# Patient Record
Sex: FEMALE | Race: BLACK OR AFRICAN AMERICAN | Employment: FULL TIME | URBAN - METROPOLITAN AREA
[De-identification: names, ages, dates, MRNs, and addresses within clinical notes are randomized per-mention and may not be internally consistent; named-entity substitution may affect disease eponyms.]

---

## 2019-07-06 ENCOUNTER — HOSPITAL ENCOUNTER (EMERGENCY)
Age: 39
Discharge: HOME | End: 2019-07-06
Attending: EMERGENCY MEDICINE
Payer: COMMERCIAL

## 2019-07-06 VITALS
HEART RATE: 116 BPM | HEIGHT: 67 IN | SYSTOLIC BLOOD PRESSURE: 142 MMHG | DIASTOLIC BLOOD PRESSURE: 74 MMHG | RESPIRATION RATE: 16 BRPM | BODY MASS INDEX: 45.99 KG/M2 | OXYGEN SATURATION: 98 % | TEMPERATURE: 98.1 F | WEIGHT: 293 LBS

## 2019-07-06 DIAGNOSIS — N30.01 ACUTE CYSTITIS WITH HEMATURIA: Primary | ICD-10-CM

## 2019-07-06 LAB
APPEARANCE UR: ABNORMAL
BACTERIA URNS QL MICRO: ABNORMAL /HPF
BILIRUB UR QL CFM: NEGATIVE
COLOR UR: ABNORMAL
EPITH CASTS URNS QL MICRO: ABNORMAL /LPF
GLUCOSE UR STRIP.AUTO-MCNC: NEGATIVE MG/DL
HGB UR QL STRIP: ABNORMAL
KETONES UR QL STRIP.AUTO: ABNORMAL MG/DL
LEUKOCYTE ESTERASE UR QL STRIP.AUTO: ABNORMAL
NITRITE UR QL STRIP.AUTO: NEGATIVE
PH UR STRIP: 5.5 [PH] (ref 5–8)
PROT UR STRIP-MCNC: 30 MG/DL
RBC #/AREA URNS HPF: ABNORMAL /HPF (ref 0–5)
SP GR UR REFRACTOMETRY: 1.03 (ref 1–1.03)
UA: UC IF INDICATED,UAUC: ABNORMAL
UROBILINOGEN UR QL STRIP.AUTO: 1 EU/DL (ref 0.2–1)
WBC URNS QL MICRO: ABNORMAL /HPF (ref 0–4)

## 2019-07-06 PROCEDURE — 99283 EMERGENCY DEPT VISIT LOW MDM: CPT

## 2019-07-06 PROCEDURE — 87086 URINE CULTURE/COLONY COUNT: CPT

## 2019-07-06 PROCEDURE — 87186 SC STD MICRODIL/AGAR DIL: CPT

## 2019-07-06 PROCEDURE — 81001 URINALYSIS AUTO W/SCOPE: CPT

## 2019-07-06 PROCEDURE — 74011250637 HC RX REV CODE- 250/637: Performed by: STUDENT IN AN ORGANIZED HEALTH CARE EDUCATION/TRAINING PROGRAM

## 2019-07-06 PROCEDURE — 87077 CULTURE AEROBIC IDENTIFY: CPT

## 2019-07-06 RX ORDER — NITROFURANTOIN 25; 75 MG/1; MG/1
100 CAPSULE ORAL
Status: COMPLETED | OUTPATIENT
Start: 2019-07-06 | End: 2019-07-06

## 2019-07-06 RX ORDER — NITROFURANTOIN 25; 75 MG/1; MG/1
100 CAPSULE ORAL 2 TIMES DAILY
Qty: 10 CAP | Refills: 0 | Status: SHIPPED | OUTPATIENT
Start: 2019-07-06 | End: 2019-07-11

## 2019-07-06 RX ADMIN — NITROFURANTOIN MONOHYDRATE/MACROCRYSTALLINE 100 MG: 25; 75 CAPSULE ORAL at 21:48

## 2019-07-07 NOTE — DISCHARGE INSTRUCTIONS

## 2019-07-07 NOTE — ED PROVIDER NOTES
EMERGENCY DEPARTMENT HISTORY AND PHYSICAL EXAM      Date: 7/6/2019  Patient Name: Dee Peabody    History of Presenting Illness     Chief Complaint   Patient presents with    Urinary Frequency     frequency, pain, urgency x2 weeks       History Provided By: Patient    HPI: Dee Peabody, 41 y.o. female with no significant PMHx, presents ambulatory to the ED with cc of urinary frequency x 2 weeks. Reports associated urgency, and intermittent trace hematuria. Denies dysuria, abdominal pain, fever, vaginal bleeding or discharge. Feels like previous UTI's. There are no other complaints, changes, or physical findings at this time. PCP: Jean Claude Dan MD    No current facility-administered medications on file prior to encounter. No current outpatient medications on file prior to encounter. Past History     Past Medical History:  Past Medical History:   Diagnosis Date    Cholestasis of pregnancy 4/3/2013    Diabetes mellitus     HX OTHER MEDICAL     cholestasis or pregnancy       Past Surgical History:  No past surgical history on file. Family History:  No family history on file. Social History:  Social History     Tobacco Use    Smoking status: Never Smoker   Substance Use Topics    Alcohol use: No    Drug use: No       Allergies: Allergies   Allergen Reactions    Penicillins Rash    Benadryl [Diphenhydramine Hcl] Unknown (comments)     Stomach aches per patient         Review of Systems   Review of Systems   Constitutional: Negative for chills and fever. HENT: Negative for congestion and rhinorrhea. Respiratory: Negative for cough and shortness of breath. Cardiovascular: Negative for chest pain and leg swelling. Gastrointestinal: Negative for abdominal pain, constipation, diarrhea, nausea and vomiting. Genitourinary: Positive for frequency, hematuria and urgency. Negative for difficulty urinating and dysuria.    Musculoskeletal: Negative for back pain and neck pain.   Skin: Negative for color change and rash. Neurological: Negative for dizziness, weakness, light-headedness, numbness and headaches. Psychiatric/Behavioral: Negative for agitation and confusion. Physical Exam   Physical Exam   Constitutional: She is oriented to person, place, and time. She appears well-developed and well-nourished. No distress. HENT:   Head: Normocephalic and atraumatic. Eyes: Pupils are equal, round, and reactive to light. Conjunctivae are normal.   Neck: Neck supple. No tracheal deviation present. Cardiovascular: Normal rate, regular rhythm and normal heart sounds. Pulmonary/Chest: Effort normal and breath sounds normal. No respiratory distress. Abdominal: Soft. Bowel sounds are normal. She exhibits no distension. There is no tenderness. Musculoskeletal: She exhibits no edema or deformity. Neurological: She is alert and oriented to person, place, and time. Skin: Skin is warm and dry. Psychiatric: She has a normal mood and affect. Nursing note and vitals reviewed.       Diagnostic Study Results     Labs -     Recent Results (from the past 12 hour(s))   URINALYSIS W/ REFLEX CULTURE    Collection Time: 07/06/19  9:00 PM   Result Value Ref Range    Color YELLOW/STRAW      Appearance CLOUDY (A) CLEAR      Specific gravity 1.027 1.003 - 1.030      pH (UA) 5.5 5.0 - 8.0      Protein 30 (A) NEG mg/dL    Glucose NEGATIVE  NEG mg/dL    Ketone TRACE (A) NEG mg/dL    Blood TRACE (A) NEG      Urobilinogen 1.0 0.2 - 1.0 EU/dL    Nitrites NEGATIVE  NEG      Leukocyte Esterase LARGE (A) NEG      WBC 20-50 0 - 4 /hpf    RBC 5-10 0 - 5 /hpf    Epithelial cells MODERATE (A) FEW /lpf    Bacteria 2+ (A) NEG /hpf    UA:UC IF INDICATED URINE CULTURE ORDERED (A) CNI     BILIRUBIN, CONFIRM    Collection Time: 07/06/19  9:00 PM   Result Value Ref Range    Bilirubin UA, confirm NEGATIVE  NEG         Radiologic Studies -   No orders to display     CT Results  (Last 48 hours)    None CXR Results  (Last 48 hours)    None            Medical Decision Making   I am the first provider for this patient. I reviewed the vital signs, available nursing notes, past medical history, past surgical history, family history and social history. Vital Signs-Reviewed the patient's vital signs. Patient Vitals for the past 12 hrs:   Temp Pulse Resp BP SpO2   07/06/19 2044 98.1 °F (36.7 °C) (!) 116 16 142/74 98 %       Pulse Oximetry Analysis - 100% on RA    Cardiac Monitor:   Rate: 100 bpm  Rhythm: Normal Sinus Rhythm     Records Reviewed: Nursing Notes    Provider Notes (Medical Decision Making):   DDx: UTI, urinary frequency    Will obtain UA, dc with abx if UTI found. ED Course:   Initial assessment performed. The patients presenting problems have been discussed, and they are in agreement with the care plan formulated and outlined with them. I have encouraged them to ask questions as they arise throughout their visit. 10:14 PM  UTI on UA. Will dc with Macrobid. Disposition:  Discharge home    PLAN:  1. Current Discharge Medication List      START taking these medications    Details   nitrofurantoin, macrocrystal-monohydrate, (MACROBID) 100 mg capsule Take 1 Cap by mouth two (2) times a day for 5 days. Indications: Bacterial Urinary Tract Infection  Qty: 10 Cap, Refills: 0           2. Follow-up Information     Follow up With Specialties Details Why Contact Info    Primary Care Physician  In 3 days      [facility] EMERGENCY DEPT Emergency Medicine  If symptoms worsen [address] [place] [address]  [phone]        Return to ED if worse     Diagnosis     Clinical Impression:   1.  Acute cystitis with hematuria        Attestations:    Signed by Dr. Cornelia Hendricks

## 2019-07-09 LAB
BACTERIA SPEC CULT: ABNORMAL
CC UR VC: ABNORMAL
SERVICE CMNT-IMP: ABNORMAL

## 2021-07-17 ENCOUNTER — HOSPITAL ENCOUNTER (INPATIENT)
Age: 41
LOS: 9 days | Discharge: HOME | DRG: 871 | End: 2021-07-26
Attending: EMERGENCY MEDICINE | Admitting: INTERNAL MEDICINE
Payer: COMMERCIAL

## 2021-07-17 ENCOUNTER — APPOINTMENT (OUTPATIENT)
Dept: GENERAL RADIOLOGY | Age: 41
DRG: 871 | End: 2021-07-17
Attending: EMERGENCY MEDICINE
Payer: COMMERCIAL

## 2021-07-17 DIAGNOSIS — U07.1 PNEUMONIA DUE TO COVID-19 VIRUS: Primary | ICD-10-CM

## 2021-07-17 DIAGNOSIS — J12.82 PNEUMONIA DUE TO COVID-19 VIRUS: Primary | ICD-10-CM

## 2021-07-17 LAB
ALBUMIN SERPL-MCNC: 3.6 G/DL (ref 3.5–5)
ALBUMIN/GLOB SERPL: 0.8 {RATIO} (ref 1.1–2.2)
ALP SERPL-CCNC: 174 U/L (ref 45–117)
ALT SERPL-CCNC: 96 U/L (ref 12–78)
ANION GAP SERPL CALC-SCNC: 5 MMOL/L (ref 5–15)
AST SERPL-CCNC: 90 U/L (ref 15–37)
BASOPHILS # BLD: 0 K/UL (ref 0–0.1)
BASOPHILS NFR BLD: 0 % (ref 0–1)
BILIRUB SERPL-MCNC: 0.4 MG/DL (ref 0.2–1)
BUN SERPL-MCNC: 7 MG/DL (ref 6–20)
BUN/CREAT SERPL: 7 (ref 12–20)
CALCIUM SERPL-MCNC: 8.5 MG/DL (ref 8.5–10.1)
CHLORIDE SERPL-SCNC: 102 MMOL/L (ref 97–108)
CO2 SERPL-SCNC: 31 MMOL/L (ref 21–32)
CREAT SERPL-MCNC: 1.04 MG/DL (ref 0.55–1.02)
D DIMER PPP FEU-MCNC: 0.57 MG/L FEU (ref 0–0.65)
DIFFERENTIAL METHOD BLD: ABNORMAL
EOSINOPHIL # BLD: 0 K/UL (ref 0–0.4)
EOSINOPHIL NFR BLD: 0 % (ref 0–7)
ERYTHROCYTE [DISTWIDTH] IN BLOOD BY AUTOMATED COUNT: 15.4 % (ref 11.5–14.5)
GLOBULIN SER CALC-MCNC: 4.8 G/DL (ref 2–4)
GLUCOSE SERPL-MCNC: 115 MG/DL (ref 65–100)
HCT VFR BLD AUTO: 42.7 % (ref 35–47)
HGB BLD-MCNC: 13.5 G/DL (ref 11.5–16)
IMM GRANULOCYTES # BLD AUTO: 0 K/UL (ref 0–0.04)
IMM GRANULOCYTES NFR BLD AUTO: 0 % (ref 0–0.5)
LACTATE BLD-SCNC: 1.21 MMOL/L (ref 0.4–2)
LYMPHOCYTES # BLD: 0.8 K/UL (ref 0.8–3.5)
LYMPHOCYTES NFR BLD: 12 % (ref 12–49)
MCH RBC QN AUTO: 26.5 PG (ref 26–34)
MCHC RBC AUTO-ENTMCNC: 31.6 G/DL (ref 30–36.5)
MCV RBC AUTO: 83.7 FL (ref 80–99)
MONOCYTES # BLD: 0.1 K/UL (ref 0–1)
MONOCYTES NFR BLD: 2 % (ref 5–13)
NEUTS BAND NFR BLD MANUAL: 4 %
NEUTS SEG # BLD: 5.5 K/UL (ref 1.8–8)
NEUTS SEG NFR BLD: 82 % (ref 32–75)
NRBC # BLD: 0 K/UL (ref 0–0.01)
NRBC BLD-RTO: 0 PER 100 WBC
PLATELET # BLD AUTO: 141 K/UL (ref 150–400)
PMV BLD AUTO: 10.1 FL (ref 8.9–12.9)
POTASSIUM SERPL-SCNC: 3.5 MMOL/L (ref 3.5–5.1)
PROT SERPL-MCNC: 8.4 G/DL (ref 6.4–8.2)
RBC # BLD AUTO: 5.1 M/UL (ref 3.8–5.2)
RBC MORPH BLD: ABNORMAL
SODIUM SERPL-SCNC: 138 MMOL/L (ref 136–145)
TROPONIN I SERPL-MCNC: <0.05 NG/ML
WBC # BLD AUTO: 6.4 K/UL (ref 3.6–11)
WBC MORPH BLD: ABNORMAL

## 2021-07-17 PROCEDURE — 65660000000 HC RM CCU STEPDOWN

## 2021-07-17 PROCEDURE — 71045 X-RAY EXAM CHEST 1 VIEW: CPT

## 2021-07-17 PROCEDURE — 74011250637 HC RX REV CODE- 250/637: Performed by: EMERGENCY MEDICINE

## 2021-07-17 PROCEDURE — 99285 EMERGENCY DEPT VISIT HI MDM: CPT

## 2021-07-17 PROCEDURE — 85379 FIBRIN DEGRADATION QUANT: CPT

## 2021-07-17 PROCEDURE — 96361 HYDRATE IV INFUSION ADD-ON: CPT

## 2021-07-17 PROCEDURE — 74011250637 HC RX REV CODE- 250/637: Performed by: GENERAL ACUTE CARE HOSPITAL

## 2021-07-17 PROCEDURE — 96360 HYDRATION IV INFUSION INIT: CPT

## 2021-07-17 PROCEDURE — 74011000250 HC RX REV CODE- 250: Performed by: GENERAL ACUTE CARE HOSPITAL

## 2021-07-17 PROCEDURE — 74011250636 HC RX REV CODE- 250/636: Performed by: EMERGENCY MEDICINE

## 2021-07-17 PROCEDURE — 36415 COLL VENOUS BLD VENIPUNCTURE: CPT

## 2021-07-17 PROCEDURE — 87040 BLOOD CULTURE FOR BACTERIA: CPT

## 2021-07-17 PROCEDURE — 74011000258 HC RX REV CODE- 258: Performed by: GENERAL ACUTE CARE HOSPITAL

## 2021-07-17 PROCEDURE — 80053 COMPREHEN METABOLIC PANEL: CPT

## 2021-07-17 PROCEDURE — 83605 ASSAY OF LACTIC ACID: CPT

## 2021-07-17 PROCEDURE — 84484 ASSAY OF TROPONIN QUANT: CPT

## 2021-07-17 PROCEDURE — 85025 COMPLETE CBC W/AUTO DIFF WBC: CPT

## 2021-07-17 PROCEDURE — 74011250636 HC RX REV CODE- 250/636: Performed by: GENERAL ACUTE CARE HOSPITAL

## 2021-07-17 PROCEDURE — 65270000029 HC RM PRIVATE

## 2021-07-17 PROCEDURE — 94640 AIRWAY INHALATION TREATMENT: CPT

## 2021-07-17 PROCEDURE — 93005 ELECTROCARDIOGRAM TRACING: CPT

## 2021-07-17 RX ORDER — ONDANSETRON 4 MG/1
4 TABLET, ORALLY DISINTEGRATING ORAL
Status: DISCONTINUED | OUTPATIENT
Start: 2021-07-17 | End: 2021-07-26 | Stop reason: HOSPADM

## 2021-07-17 RX ORDER — SODIUM CHLORIDE 0.9 % (FLUSH) 0.9 %
5-40 SYRINGE (ML) INJECTION AS NEEDED
Status: DISCONTINUED | OUTPATIENT
Start: 2021-07-17 | End: 2021-07-26 | Stop reason: HOSPADM

## 2021-07-17 RX ORDER — POLYETHYLENE GLYCOL 3350 17 G/17G
17 POWDER, FOR SOLUTION ORAL DAILY PRN
Status: DISCONTINUED | OUTPATIENT
Start: 2021-07-17 | End: 2021-07-26 | Stop reason: HOSPADM

## 2021-07-17 RX ORDER — ACETAMINOPHEN 500 MG
1000 TABLET ORAL
Status: COMPLETED | OUTPATIENT
Start: 2021-07-17 | End: 2021-07-17

## 2021-07-17 RX ORDER — ONDANSETRON 2 MG/ML
4 INJECTION INTRAMUSCULAR; INTRAVENOUS
Status: DISCONTINUED | OUTPATIENT
Start: 2021-07-17 | End: 2021-07-26 | Stop reason: HOSPADM

## 2021-07-17 RX ORDER — ACETAMINOPHEN 325 MG/1
650 TABLET ORAL
Status: DISCONTINUED | OUTPATIENT
Start: 2021-07-17 | End: 2021-07-26 | Stop reason: HOSPADM

## 2021-07-17 RX ORDER — SODIUM CHLORIDE 0.9 % (FLUSH) 0.9 %
5-40 SYRINGE (ML) INJECTION EVERY 8 HOURS
Status: DISCONTINUED | OUTPATIENT
Start: 2021-07-17 | End: 2021-07-26 | Stop reason: HOSPADM

## 2021-07-17 RX ORDER — ACETAMINOPHEN 650 MG/1
650 SUPPOSITORY RECTAL
Status: DISCONTINUED | OUTPATIENT
Start: 2021-07-17 | End: 2021-07-26 | Stop reason: HOSPADM

## 2021-07-17 RX ORDER — DEXAMETHASONE SODIUM PHOSPHATE 4 MG/ML
6 INJECTION, SOLUTION INTRA-ARTICULAR; INTRALESIONAL; INTRAMUSCULAR; INTRAVENOUS; SOFT TISSUE DAILY
Status: COMPLETED | OUTPATIENT
Start: 2021-07-18 | End: 2021-07-26

## 2021-07-17 RX ORDER — LEVOFLOXACIN 5 MG/ML
750 INJECTION, SOLUTION INTRAVENOUS EVERY 24 HOURS
Status: DISCONTINUED | OUTPATIENT
Start: 2021-07-17 | End: 2021-07-20

## 2021-07-17 RX ORDER — ENOXAPARIN SODIUM 100 MG/ML
40 INJECTION SUBCUTANEOUS 2 TIMES DAILY
Status: DISCONTINUED | OUTPATIENT
Start: 2021-07-18 | End: 2021-07-26 | Stop reason: HOSPADM

## 2021-07-17 RX ADMIN — ACETAMINOPHEN 1000 MG: 500 TABLET ORAL at 16:09

## 2021-07-17 RX ADMIN — SODIUM CHLORIDE 1000 ML: 9 INJECTION, SOLUTION INTRAVENOUS at 16:08

## 2021-07-17 RX ADMIN — LEVOFLOXACIN 750 MG: 5 INJECTION, SOLUTION INTRAVENOUS at 19:50

## 2021-07-17 RX ADMIN — IPRATROPIUM BROMIDE AND ALBUTEROL 1 PUFF: 20; 100 SPRAY, METERED RESPIRATORY (INHALATION) at 23:01

## 2021-07-17 RX ADMIN — Medication 10 ML: at 22:34

## 2021-07-17 RX ADMIN — IPRATROPIUM BROMIDE AND ALBUTEROL 1 PUFF: 20; 100 SPRAY, METERED RESPIRATORY (INHALATION) at 20:46

## 2021-07-17 RX ADMIN — REMDESIVIR 200 MG: 100 INJECTION, POWDER, LYOPHILIZED, FOR SOLUTION INTRAVENOUS at 20:46

## 2021-07-17 NOTE — ED NOTES
Patient attempted to provide UA sample at this time without success. Gave pt PO water, will attempt to obtain UA sample again.

## 2021-07-17 NOTE — H&P
Hospitalist Admission Note    NAME: Elliot Littlejohn   :  1980   MRN:  [id number]     Date/Time:  2021 7:08 PM    Patient PCP: None  ______________________________________________________________________  Given the patient's current clinical presentation, I have a high level of concern for decompensation if discharged from the emergency department. Complex decision making was performed, which includes reviewing the patient's available past medical records, laboratory results, and x-ray films. My assessment of this patient's clinical condition and my plan of care is as follows. Assessment / Plan:  Acute hypoxic respiratory failure secondary to COVID-19 PNA  Admit to Telemetry  Droplet Plus precautions  Continue O2 supplementation - currently on 3L NC - wean as tolerated  Pharmacy to dose Remdesivir  Pulmonary Consult  IV steroids, bronchodilators  Empiric abx  Send inflammatory markers  Follow procalcitonin  Incentive spirometer    CXR: IMPRESSION  Hypoinspiratory lungs with evidence of COVID 19 pneumonia. Code Status: Full  Surrogate Decision Maker:   DVT Prophylaxis: Lovenox  GI Prophylaxis: not indicated  Baseline: Independent      Subjective:   CHIEF COMPLAINT: SOB, fever    HISTORY OF PRESENT ILLNESS:     Prieto Alicea is a 47 y.o.  female who presents with CC listed above. Pt states that her daughter was the first one in the family to be diagnosed with COVID-19. They have been quaranting together since appox . Her  is also ill with COVID-19 but she states that she has had the worst of symptoms. She was tested at an outside facility on Monday and was told that she was positive on Tuesday. Her and her family have not been vaccinated. She endorses cough with minimal sputum production. We were asked to admit for work up and evaluation of the above problems.      Past Medical History:   Diagnosis Date    Cholestasis of pregnancy 4/3/2013    Diabetes mellitus     HX OTHER MEDICAL     cholestasis or pregnancy        No past surgical history on file. Social History     Tobacco Use    Smoking status: Never Smoker   Substance Use Topics    Alcohol use: No        No family history on file. Allergies   Allergen Reactions    Penicillins Rash    Benadryl [Diphenhydramine Hcl] Unknown (comments)     Stomach aches per patient        Prior to Admission medications    Not on File       REVIEW OF SYSTEMS:     I am not able to complete the review of systems because:    The patient is intubated and sedated    The patient has altered mental status due to his acute medical problems    The patient has baseline aphasia from prior stroke(s)    The patient has baseline dementia and is not reliable historian    The patient is in acute medical distress and unable to provide information           Total of 12 systems reviewed as follows:       POSITIVE= underlined text  Negative = text not underlined  General:  fever, chills, sweats, generalized weakness, weight loss/gain,      loss of appetite   Eyes:    blurred vision, eye pain, loss of vision, double vision  ENT:    rhinorrhea, pharyngitis   Respiratory:   cough, sputum production, SOB, BROOKS, wheezing, pleuritic pain   Cardiology:   chest pain, palpitations, orthopnea, PND, edema, syncope   Gastrointestinal:  abdominal pain , N/V, diarrhea, dysphagia, constipation, bleeding   Genitourinary:  frequency, urgency, dysuria, hematuria, incontinence   Muskuloskeletal :  arthralgia, myalgia, back pain  Hematology:  easy bruising, nose or gum bleeding, lymphadenopathy   Dermatological: rash, ulceration, pruritis, color change / jaundice  Endocrine:   hot flashes or polydipsia   Neurological:  headache, dizziness, confusion, focal weakness, paresthesia,     Speech difficulties, memory loss, gait difficulty  Psychological: Feelings of anxiety, depression, agitation    Objective:   VITALS:    Visit Vitals  /77   Pulse (!) 112   Temp 100.3 °F (37.9 °C)   Resp 30   Ht 5' 7\" (1.702 m)   Wt 138 kg (304 lb 3.8 oz)   SpO2 95%   BMI 47.65 kg/m²       PHYSICAL EXAM:    General:    Alert, cooperative, ill appearing  HEENT: Atraumatic, anicteric sclerae, pink conjunctivae  Neck:  Supple, symmetrical,  thyroid: non tender  Lungs:   Coarse BS, on 3L NC. Chest wall:  No tenderness  No Accessory muscle use. Heart:   Tachycardic  No  murmur   No edema  Abdomen:   Soft, non-tender. Not distended. Bowel sounds normal  Extremities: No cyanosis. No clubbing,      Skin turgor normal, Capillary refill normal, Radial dial pulse 2+  Skin:     Not pale. Not Jaundiced  No rashes   Psych:  Good insight. Not depressed. Not anxious or agitated. Neurologic: EOMs intact. No facial asymmetry. No aphasia or slurred speech. Symmetrical strength, Sensation grossly intact. Alert and oriented X 4.     _______________________________________________________________________  Care Plan discussed with:    Comments   Patient x    Family      RN x    Care Manager                    Consultant:      _______________________________________________________________________  Expected  Disposition:   Home with Family    HH/PT/OT/RN x   SNF/LTC    JERI    ________________________________________________________________________  TOTAL TIME:  54 Minutes    Critical Care Provided     Minutes non procedure based      Comments     Reviewed previous records   >50% of visit spent in counseling and coordination of care  Discussion with patient and/or family and questions answered       ________________________________________________________________________  Signed: George Gooden MD    Procedures: see electronic medical records for all procedures/Xrays and details which were not copied into this note but were reviewed prior to creation of Plan.     LAB DATA REVIEWED:    Recent Results (from the past 24 hour(s))   EKG, 12 LEAD, INITIAL    Collection Time: 07/17/21  3:37 PM   Result Value Ref Range    Ventricular Rate 125 BPM    Atrial Rate 125 BPM    P-R Interval 144 ms    QRS Duration 84 ms    Q-T Interval 294 ms    QTC Calculation (Bezet) 424 ms    Calculated P Axis 49 degrees    Calculated R Axis -9 degrees    Calculated T Axis 22 degrees    Diagnosis       Sinus tachycardia  Possible Left atrial enlargement  Cannot rule out Anterior infarct , age undetermined  No previous ECGs available     POC LACTIC ACID    Collection Time: 07/17/21  3:55 PM   Result Value Ref Range    Lactic Acid (POC) 1.21 0.40 - 2.00 mmol/L   TROPONIN I    Collection Time: 07/17/21  4:03 PM   Result Value Ref Range    Troponin-I, Qt. <0.05 <0.05 ng/mL   CBC WITH AUTOMATED DIFF    Collection Time: 07/17/21  4:03 PM   Result Value Ref Range    WBC 6.4 3.6 - 11.0 K/uL    RBC 5.10 3.80 - 5.20 M/uL    HGB 13.5 11.5 - 16.0 g/dL    HCT 42.7 35.0 - 47.0 %    MCV 83.7 80.0 - 99.0 FL    MCH 26.5 26.0 - 34.0 PG    MCHC 31.6 30.0 - 36.5 g/dL    RDW 15.4 (H) 11.5 - 14.5 %    PLATELET 287 (L) 061 - 400 K/uL    MPV 10.1 8.9 - 12.9 FL    NRBC 0.0 0  WBC    ABSOLUTE NRBC 0.00 0.00 - 0.01 K/uL    NEUTROPHILS 82 (H) 32 - 75 %    BAND NEUTROPHILS 4 %    LYMPHOCYTES 12 12 - 49 %    MONOCYTES 2 (L) 5 - 13 %    EOSINOPHILS 0 0 - 7 %    BASOPHILS 0 0 - 1 %    IMMATURE GRANULOCYTES 0 0.0 - 0.5 %    ABS. NEUTROPHILS 5.5 1.8 - 8.0 K/UL    ABS. LYMPHOCYTES 0.8 0.8 - 3.5 K/UL    ABS. MONOCYTES 0.1 0.0 - 1.0 K/UL    ABS. EOSINOPHILS 0.0 0.0 - 0.4 K/UL    ABS. BASOPHILS 0.0 0.0 - 0.1 K/UL    ABS. IMM.  GRANS. 0.0 0.00 - 0.04 K/UL    DF MANUAL      RBC COMMENTS NORMOCYTIC, NORMOCHROMIC      WBC COMMENTS REACTIVE LYMPHS     METABOLIC PANEL, COMPREHENSIVE    Collection Time: 07/17/21  4:03 PM   Result Value Ref Range    Sodium 138 136 - 145 mmol/L    Potassium 3.5 3.5 - 5.1 mmol/L    Chloride 102 97 - 108 mmol/L    CO2 31 21 - 32 mmol/L    Anion gap 5 5 - 15 mmol/L    Glucose 115 (H) 65 - 100 mg/dL    BUN 7 6 - 20 MG/DL    Creatinine 1.04 (H) 0.55 - 1.02 MG/DL    BUN/Creatinine ratio 7 (L) 12 - 20      GFR est AA >60 >60 ml/min/1.73m2    GFR est non-AA 59 (L) >60 ml/min/1.73m2    Calcium 8.5 8.5 - 10.1 MG/DL    Bilirubin, total 0.4 0.2 - 1.0 MG/DL    ALT (SGPT) 96 (H) 12 - 78 U/L    AST (SGOT) 90 (H) 15 - 37 U/L    Alk.  phosphatase 174 (H) 45 - 117 U/L    Protein, total 8.4 (H) 6.4 - 8.2 g/dL    Albumin 3.6 3.5 - 5.0 g/dL    Globulin 4.8 (H) 2.0 - 4.0 g/dL    A-G Ratio 0.8 (L) 1.1 - 2.2

## 2021-07-18 LAB
ALBUMIN SERPL-MCNC: 3.1 G/DL (ref 3.5–5)
ALBUMIN/GLOB SERPL: 0.7 {RATIO} (ref 1.1–2.2)
ALP SERPL-CCNC: 176 U/L (ref 45–117)
ALT SERPL-CCNC: 96 U/L (ref 12–78)
ANION GAP SERPL CALC-SCNC: 6 MMOL/L (ref 5–15)
APPEARANCE UR: ABNORMAL
AST SERPL-CCNC: 99 U/L (ref 15–37)
ATRIAL RATE: 125 BPM
BACTERIA URNS QL MICRO: NEGATIVE /HPF
BASOPHILS # BLD: 0 K/UL (ref 0–0.1)
BASOPHILS NFR BLD: 0 % (ref 0–1)
BILIRUB SERPL-MCNC: 0.6 MG/DL (ref 0.2–1)
BILIRUB UR QL: NEGATIVE
BUN SERPL-MCNC: 7 MG/DL (ref 6–20)
BUN/CREAT SERPL: 8 (ref 12–20)
CALCIUM SERPL-MCNC: 8.3 MG/DL (ref 8.5–10.1)
CALCULATED P AXIS, ECG09: 49 DEGREES
CALCULATED R AXIS, ECG10: -9 DEGREES
CALCULATED T AXIS, ECG11: 22 DEGREES
CHLORIDE SERPL-SCNC: 104 MMOL/L (ref 97–108)
CO2 SERPL-SCNC: 28 MMOL/L (ref 21–32)
COLOR UR: ABNORMAL
CREAT SERPL-MCNC: 0.84 MG/DL (ref 0.55–1.02)
DIAGNOSIS, 93000: NORMAL
DIFFERENTIAL METHOD BLD: ABNORMAL
EOSINOPHIL # BLD: 0 K/UL (ref 0–0.4)
EOSINOPHIL NFR BLD: 0 % (ref 0–7)
EPITH CASTS URNS QL MICRO: ABNORMAL /LPF
ERYTHROCYTE [DISTWIDTH] IN BLOOD BY AUTOMATED COUNT: 15.5 % (ref 11.5–14.5)
GLOBULIN SER CALC-MCNC: 4.2 G/DL (ref 2–4)
GLUCOSE SERPL-MCNC: 112 MG/DL (ref 65–100)
GLUCOSE UR STRIP.AUTO-MCNC: NEGATIVE MG/DL
HCT VFR BLD AUTO: 38.8 % (ref 35–47)
HGB BLD-MCNC: 12.2 G/DL (ref 11.5–16)
HGB UR QL STRIP: NEGATIVE
IMM GRANULOCYTES # BLD AUTO: 0 K/UL (ref 0–0.04)
IMM GRANULOCYTES NFR BLD AUTO: 1 % (ref 0–0.5)
KETONES UR QL STRIP.AUTO: 15 MG/DL
LEUKOCYTE ESTERASE UR QL STRIP.AUTO: ABNORMAL
LYMPHOCYTES # BLD: 0.8 K/UL (ref 0.8–3.5)
LYMPHOCYTES NFR BLD: 13 % (ref 12–49)
MAGNESIUM SERPL-MCNC: 1.9 MG/DL (ref 1.6–2.4)
MCH RBC QN AUTO: 26.2 PG (ref 26–34)
MCHC RBC AUTO-ENTMCNC: 31.4 G/DL (ref 30–36.5)
MCV RBC AUTO: 83.3 FL (ref 80–99)
MONOCYTES # BLD: 0.2 K/UL (ref 0–1)
MONOCYTES NFR BLD: 3 % (ref 5–13)
NEUTS SEG # BLD: 5.2 K/UL (ref 1.8–8)
NEUTS SEG NFR BLD: 83 % (ref 32–75)
NITRITE UR QL STRIP.AUTO: NEGATIVE
NRBC # BLD: 0 K/UL (ref 0–0.01)
NRBC BLD-RTO: 0 PER 100 WBC
P-R INTERVAL, ECG05: 144 MS
PH UR STRIP: 5.5 [PH] (ref 5–8)
PHOSPHATE SERPL-MCNC: 2.1 MG/DL (ref 2.6–4.7)
PLATELET # BLD AUTO: 151 K/UL (ref 150–400)
PMV BLD AUTO: 10.2 FL (ref 8.9–12.9)
POTASSIUM SERPL-SCNC: 3.7 MMOL/L (ref 3.5–5.1)
PROCALCITONIN SERPL-MCNC: 0.18 NG/ML
PROT SERPL-MCNC: 7.3 G/DL (ref 6.4–8.2)
PROT UR STRIP-MCNC: 30 MG/DL
Q-T INTERVAL, ECG07: 294 MS
QRS DURATION, ECG06: 84 MS
QTC CALCULATION (BEZET), ECG08: 424 MS
RBC # BLD AUTO: 4.66 M/UL (ref 3.8–5.2)
RBC #/AREA URNS HPF: ABNORMAL /HPF (ref 0–5)
SODIUM SERPL-SCNC: 138 MMOL/L (ref 136–145)
SP GR UR REFRACTOMETRY: 1.02 (ref 1–1.03)
UROBILINOGEN UR QL STRIP.AUTO: 1 EU/DL (ref 0.2–1)
VENTRICULAR RATE, ECG03: 125 BPM
WBC # BLD AUTO: 6.2 K/UL (ref 3.6–11)
WBC URNS QL MICRO: ABNORMAL /HPF (ref 0–4)

## 2021-07-18 PROCEDURE — 94640 AIRWAY INHALATION TREATMENT: CPT

## 2021-07-18 PROCEDURE — 84100 ASSAY OF PHOSPHORUS: CPT

## 2021-07-18 PROCEDURE — 74011250636 HC RX REV CODE- 250/636: Performed by: GENERAL ACUTE CARE HOSPITAL

## 2021-07-18 PROCEDURE — 80053 COMPREHEN METABOLIC PANEL: CPT

## 2021-07-18 PROCEDURE — 77010033678 HC OXYGEN DAILY

## 2021-07-18 PROCEDURE — 85025 COMPLETE CBC W/AUTO DIFF WBC: CPT

## 2021-07-18 PROCEDURE — 74011250637 HC RX REV CODE- 250/637: Performed by: GENERAL ACUTE CARE HOSPITAL

## 2021-07-18 PROCEDURE — 74011250637 HC RX REV CODE- 250/637: Performed by: STUDENT IN AN ORGANIZED HEALTH CARE EDUCATION/TRAINING PROGRAM

## 2021-07-18 PROCEDURE — 65660000000 HC RM CCU STEPDOWN

## 2021-07-18 PROCEDURE — 36415 COLL VENOUS BLD VENIPUNCTURE: CPT

## 2021-07-18 PROCEDURE — 83735 ASSAY OF MAGNESIUM: CPT

## 2021-07-18 PROCEDURE — 84145 PROCALCITONIN (PCT): CPT

## 2021-07-18 PROCEDURE — 74011000250 HC RX REV CODE- 250: Performed by: GENERAL ACUTE CARE HOSPITAL

## 2021-07-18 PROCEDURE — 74011000258 HC RX REV CODE- 258: Performed by: GENERAL ACUTE CARE HOSPITAL

## 2021-07-18 PROCEDURE — 81001 URINALYSIS AUTO W/SCOPE: CPT

## 2021-07-18 PROCEDURE — 94761 N-INVAS EAR/PLS OXIMETRY MLT: CPT

## 2021-07-18 RX ORDER — ASCORBIC ACID 500 MG
500 TABLET ORAL 2 TIMES DAILY
Status: DISCONTINUED | OUTPATIENT
Start: 2021-07-18 | End: 2021-07-26 | Stop reason: HOSPADM

## 2021-07-18 RX ORDER — GUAIFENESIN 600 MG/1
600 TABLET, EXTENDED RELEASE ORAL EVERY 12 HOURS
Status: DISCONTINUED | OUTPATIENT
Start: 2021-07-18 | End: 2021-07-26 | Stop reason: HOSPADM

## 2021-07-18 RX ORDER — ZINC SULFATE 50(220)MG
1 CAPSULE ORAL DAILY
Status: DISCONTINUED | OUTPATIENT
Start: 2021-07-19 | End: 2021-07-26 | Stop reason: HOSPADM

## 2021-07-18 RX ORDER — LEVOFLOXACIN 5 MG/ML
750 INJECTION, SOLUTION INTRAVENOUS EVERY 24 HOURS
Status: DISCONTINUED | OUTPATIENT
Start: 2021-07-18 | End: 2021-07-18

## 2021-07-18 RX ADMIN — Medication 10 ML: at 22:46

## 2021-07-18 RX ADMIN — Medication 10 ML: at 13:19

## 2021-07-18 RX ADMIN — IPRATROPIUM BROMIDE AND ALBUTEROL 1 PUFF: 20; 100 SPRAY, METERED RESPIRATORY (INHALATION) at 03:04

## 2021-07-18 RX ADMIN — IPRATROPIUM BROMIDE AND ALBUTEROL 1 PUFF: 20; 100 SPRAY, METERED RESPIRATORY (INHALATION) at 11:17

## 2021-07-18 RX ADMIN — REMDESIVIR 100 MG: 100 INJECTION, POWDER, LYOPHILIZED, FOR SOLUTION INTRAVENOUS at 21:03

## 2021-07-18 RX ADMIN — GUAIFENESIN 600 MG: 600 TABLET, EXTENDED RELEASE ORAL at 22:45

## 2021-07-18 RX ADMIN — LEVOFLOXACIN 750 MG: 5 INJECTION, SOLUTION INTRAVENOUS at 17:57

## 2021-07-18 RX ADMIN — ENOXAPARIN SODIUM 40 MG: 40 INJECTION SUBCUTANEOUS at 10:26

## 2021-07-18 RX ADMIN — IPRATROPIUM BROMIDE AND ALBUTEROL 1 PUFF: 20; 100 SPRAY, METERED RESPIRATORY (INHALATION) at 07:54

## 2021-07-18 RX ADMIN — IPRATROPIUM BROMIDE AND ALBUTEROL 1 PUFF: 20; 100 SPRAY, METERED RESPIRATORY (INHALATION) at 21:08

## 2021-07-18 RX ADMIN — OXYCODONE HYDROCHLORIDE AND ACETAMINOPHEN 500 MG: 500 TABLET ORAL at 13:19

## 2021-07-18 RX ADMIN — ENOXAPARIN SODIUM 40 MG: 40 INJECTION SUBCUTANEOUS at 22:45

## 2021-07-18 RX ADMIN — ACETAMINOPHEN 650 MG: 325 TABLET ORAL at 03:56

## 2021-07-18 RX ADMIN — GUAIFENESIN 600 MG: 600 TABLET, EXTENDED RELEASE ORAL at 13:19

## 2021-07-18 RX ADMIN — OXYCODONE HYDROCHLORIDE AND ACETAMINOPHEN 500 MG: 500 TABLET ORAL at 17:19

## 2021-07-18 RX ADMIN — DEXAMETHASONE SODIUM PHOSPHATE 6 MG: 4 INJECTION, SOLUTION INTRAMUSCULAR; INTRAVENOUS at 10:26

## 2021-07-18 RX ADMIN — Medication 10 ML: at 05:16

## 2021-07-18 NOTE — ED NOTES
0207 - pt assisted to commode to urinate;;     Pt remains on nasal canula 2L at this time;     0720 - Bedside shift change report given to Suzan Bass RN  (oncoming nurse) by Jayna Forman  (offgoing nurse). Report included the following information SBAR, Kardex, ED Summary, Intake/Output and MAR.

## 2021-07-18 NOTE — CONSULTS
PULMONARY MEDICINE    Initial Physician Consultation Note    Name: Nat Norwood   : 1980   MRN: [id number]   Date: 2021      Subjective:   Consult Note: 2021   Requesting Physician: Dr. Sonam Hernandez  Reason for consult: COVID 19 pneumonia    Medical records and data reviewed. Telemedicine consult completed  Patient is a 47 y.o. female who presented to the hospital with fever, dyspnea and cough for one week and was diagnosed with COVID 19 infection as an outpatient. She reports her  and 24 YO daughter also contacted the infection- none are vaccinated. She has not had prior history of chronic cardiopulmonary disease. She presented to the ER for evaluation, was hypoxic requiring 3 L/min of O2 and CXR showed multilobar infiltrates, L>R. She has been started on remdesivir, decadron and antibiotics. She is most comfortable sitting up in the chair.   She was febrile on admission    Review of Systems:     A comprehensive 12 system review of systems was negative except for as documented in HPI    Assessment:     Acute hypoxic pulmonary insufficiency- on 2 L/min O2  Pneumonia due to COVID 19 virus  Increased BMI  Other medical problems per chart    Recommendations:   On O2- wean as tolerated  IV remdesivir-- if clinical condition worsens, consider a 10 day course  IV decadron  If O2 requirement increases, consider Tocilizumab  On antibiotics  On lovenox  Prone positioning as tolerated  Incentive spirometer  Patient is acutely ill, at risk for further decline- monitor clinical course closely  D/W patient        Active Problem List:     Problem List  Date Reviewed: 8/15/2016        Codes Class    Pneumonia due to COVID-19 virus ICD-10-CM: U07.1, J12.82  ICD-9-CM: 480.8, 079.89         Cholestasis of pregnancy ICD-10-CM: O26.619, K83.1  ICD-9-CM: 646.70         Hypertension complicating pregnancy [id number]: O16.9  ICD-9-CM: 642.90               Past Medical History:      has a past medical history of Cholestasis of pregnancy (4/3/2013), Diabetes mellitus, and OTHER MEDICAL. She also has no past medical history of Abnormal Pap smear, Acquired hypothyroidism, Anemia NEC, Asthma, Complication of anesthesia, Essential hypertension, Genital herpes, unspecified, Heart abnormalities, Herpes gestationis, Herpes simplex without mention of complication, Human immunodeficiency virus (HIV) disease ([facility] Infertility, Kidney disease, Phlebitis and thrombophlebitis of unspecified site, Postpartum depression, Psychiatric problem, Rhesus isoimmunization unspecified as to episode of care in pregnancy, Sickle-cell disease, unspecified, Systemic lupus erythematosus ([facility] Trauma, Unspecified breast disorder, Unspecified diseases of blood and blood-forming organs, or Unspecified epilepsy without mention of intractable epilepsy ([facility] Past Surgical History:      has no past surgical history on file. Home Medications:     Prior to Admission medications    Not on File       Allergies/Social/Family History: Allergies   Allergen Reactions    Penicillins Rash    Benadryl [Diphenhydramine Hcl] Unknown (comments)     Stomach aches per patient      Social History     Tobacco Use    Smoking status: Never Smoker   Substance Use Topics    Alcohol use: No      No family history on file.          Objective:   Vital Signs:  Visit Vitals  /87   Pulse (!) 110   Temp 98.3 °F (36.8 °C)   Resp 25   Ht 5' 7\" (1.702 m)   Wt 138 kg (304 lb 3.8 oz)   SpO2 92%   BMI 47.65 kg/m²    O2 Flow Rate (L/min): 2 l/min O2 Device: Nasal cannula Temp (24hrs), Av.5 °F (37.5 °C), Min:98.3 °F (36.8 °C), Max:101.5 °F (38.6 °C)           Intake/Output:     Intake/Output Summary (Last 24 hours) at 2021 1347  Last data filed at 2021 0300  Gross per 24 hour   Intake 1400 ml   Output 200 ml   Net 1200 ml       Physical Exam: reviewed  Conversing with incomplete sentences        LABS AND  DATA: Personally reviewed  Recent Labs 07/18/21  0333 07/17/21  1603   WBC 6.2 6.4   HGB 12.2 13.5   HCT 38.8 42.7    141*     Recent Labs     07/18/21  0333 07/17/21  1603    138   K 3.7 3.5    102   CO2 28 31   BUN 7 7   CREA 0.84 1.04*   * 115*   CA 8.3* 8.5   MG 1.9  --    PHOS 2.1*  --      Recent Labs     07/18/21  0333 07/17/21  1603   * 174*   TP 7.3 8.4*   ALB 3.1* 3.6   GLOB 4.2* 4.8*     No results for input(s): INR, PTP, APTT, INREXT in the last 72 hours. No results for input(s): PHI, PCO2I, PO2I, FIO2I in the last 72 hours. Recent Labs     07/17/21  1603   TROIQ <0.05       MEDS: Reviewed    Chest Imaging: personally reviewed and report checked    Tele- reviewed    Medical decision making:   I have reviewed the flowsheet and previous day's notes  Patient has acute or chronic illness that poses a threat to life or bodily function  Review and order of Clinical lab tests  Review and Order of Radiology tests  Independent visualization of Image  Reviewed Oxygen  High Risk Drug therapy requiring intensive monitoring for toxicity: eg steroids, pressors, antibiotics        Thank you for allowing me to participate in this patient's care.     Eddie Lindsey MD      [facility]

## 2021-07-18 NOTE — PROGRESS NOTES
Hospitalist Progress Note    NAME: Sirena Butler   :  1980   MRN:  [id number]       Assessment / Plan:  Acute hypoxic respiratory failure secondary to COVID-19 PNA  Sepsis  Currently on 3 L nasal cannula, wean as tolerated. On remdesivir, dexamethasone, Levaquin. Pulmonary Consult  IV steroids, bronchodilators  D-dimer 0.57  Lovenox 40 mg twice daily  Procalcitonin 0.18 so we will continue the antibiotics  Empiric abx  Combivent 4 times daily  Mucinex twice daily  Vitamin C and zinc added. Incentive spirometer      40 or above Morbid obesity / Body mass index is 47.65 kg/m². Estimated discharge date: TBD  Barriers:    Code status: Full  Prophylaxis: Lovenox  Recommended Disposition: Home w/Family     Subjective:     Chief Complaint / Reason for Physician Visit  Patient seen today, feeling the same as of yesterdayshort of breath along with cough with chest congestion. Discussed with RN events overnight. Review of Systems:  Symptom Y/N Comments  Symptom Y/N Comments   Fever/Chills n   Chest Pain n    Poor Appetite y   Edema     Cough y   Abdominal Pain n    Sputum y   Joint Pain     SOB/BROKOS y   Pruritis/Rash     Nausea/vomit n   Tolerating PT/OT     Diarrhea n   Tolerating Diet     Constipation    Other       Could NOT obtain due to:      Objective:     VITALS:   Last 24hrs VS reviewed since prior progress note.  Most recent are:  Patient Vitals for the past 24 hrs:   Temp Pulse Resp BP SpO2   21 1131  (!) 110 25 118/87 92 %   21 1117     94 %   21 0930  93 (!) 31 115/79 94 %   21 0815 98.3 °F (36.8 °C) 100 25  94 %   21 0754     94 %   21 0700 98.9 °F (37.2 °C) (!) 102 29 109/73 94 %   21 0615 100 °F (37.8 °C) 89 (!) 32 112/69 93 %   21 0516 100.3 °F (37.9 °C) (!) 102 30 121/86 93 %   21 0430  99 30 121/84 93 %   21 0345 (!) 101.5 °F (38.6 °C) (!) 107 (!) 31 118/82 93 %   21 0305     95 %   21 0130 99.1 °F (37.3 °C) (!) 117 28 123/85 95 %   07/18/21 0045  (!) 113 28 127/83 94 %   07/17/21 2302     95 %   07/17/21 2100 99 °F (37.2 °C) 98 28 121/85 95 %   07/17/21 1900  (!) 101 (!) 31  94 %   07/17/21 1830  99 (!) 31  95 %   07/17/21 1738  (!) 112 30  95 %   07/17/21 1737     (!) 88 %   07/17/21 1730  (!) 105 (!) 38 122/77 (!) 89 %   07/17/21 1720  (!) 109 (!) 32  91 %   07/17/21 1715  (!) 106 (!) 39  (!) 88 %   07/17/21 1645  (!) 116 (!) 39 125/81 90 %   07/17/21 1600  (!) 113 30 124/75 94 %   07/17/21 1533 100.3 °F (37.9 °C) (!) 134 (!) 33 115/88 94 %       Intake/Output Summary (Last 24 hours) at 7/18/2021 1208  Last data filed at 7/18/2021 0300  Gross per 24 hour   Intake 1400 ml   Output 200 ml   Net 1200 ml        I had a face to face encounter and independently examined this patient on 7/18/2021, as outlined below:  PHYSICAL EXAM:  General: WD, WN. Alert, cooperative, no acute distress    EENT:  EOMI. Anicteric sclerae. MMM  Resp:  Decreased breath sounds bilaterally, no wheezing or rales. No accessory muscle use  CV:  Regular  rhythm,  No edema  GI:  Soft, Non distended, Non tender. +Bowel sounds  Neurologic:  Alert and oriented X 3, normal speech,   Psych:   Good insight. Not anxious nor agitated  Skin:  No rashes. No jaundice    Reviewed most current lab test results and cultures  YES  Reviewed most current radiology test results   YES  Review and summation of old records today    NO  Reviewed patient's current orders and MAR    YES  PMH/SH reviewed - no change compared to H&P  ________________________________________________________________________  Care Plan discussed with:    Comments   Patient x    Family      RN x    Care Manager     Consultant                        Multidiciplinary team rounds were held today with , nursing, pharmacist and clinical coordinator. Patient's plan of care was discussed; medications were reviewed and discharge planning was addressed. ________________________________________________________________________  Total NON critical care TIME:  40   Minutes    Total CRITICAL CARE TIME Spent:   Minutes non procedure based      Comments   >50% of visit spent in counseling and coordination of care     ________________________________________________________________________  Diony Leon MD     Procedures: see electronic medical records for all procedures/Xrays and details which were not copied into this note but were reviewed prior to creation of Plan. LABS:  I reviewed today's most current labs and imaging studies.   Pertinent labs include:  Recent Labs     07/18/21  0333 07/17/21  1603   WBC 6.2 6.4   HGB 12.2 13.5   HCT 38.8 42.7    141*     Recent Labs     07/18/21  0333 07/17/21  1603    138   K 3.7 3.5    102   CO2 28 31   * 115*   BUN 7 7   CREA 0.84 1.04*   CA 8.3* 8.5   MG 1.9  --    PHOS 2.1*  --    ALB 3.1* 3.6   TBILI 0.6 0.4   ALT 96* 96*       Signed: Diony Leon MD

## 2021-07-18 NOTE — ED NOTES
Patient is being transferred to [address], Room # 1715. Report given to Chino Terrell on Smart GPS Backpack for routine progression of care. Report consisted of the following information SBAR, ED Summary, Intake/Output and MAR. Patient transferred to receiving unit by: Transport (RN or tech name). Outstanding consults needed: No     Next labs due: No     The following personal items will be sent with the patient during transfer to the floor: All valuables:    Cardiac monitoring ordered: Yes    The following CURRENT information was reported to the receiving RN:    Code status: Full Code at time of transfer    Last set of vital signs:  Vital Signs  Level of Consciousness: Alert (0) (07/18/21 0930)  Temp: 98.3 °F (36.8 °C) (07/18/21 0815)  Temp Source: Oral (07/18/21 0815)  Pulse (Heart Rate): 93 (07/18/21 0930)  Heart Rate Source: Monitor (07/18/21 0700)  Cardiac Rhythm: Sinus Rhythm (07/18/21 0700)  Resp Rate: (!) 31 (07/18/21 0930)  BP: 115/79 (07/18/21 0930)  MAP (Monitor): 89 (07/18/21 0930)  MAP (Calculated): 91 (07/18/21 0930)  BP 1 Location: Left arm (07/18/21 0700)  BP 1 Method: Automatic (07/18/21 0700)  BP Patient Position: At rest (07/18/21 0700)  MEWS Score: 3 (07/18/21 0700)         Oxygen Therapy  O2 Sat (%): 94 % (07/18/21 1117)  Pulse via Oximetry: 90 beats per minute (07/18/21 1117)  O2 Device: Nasal cannula (07/18/21 1117)  O2 Flow Rate (L/min): 2 l/min (07/18/21 1117)      Last pain assessment:  Pain 1  Pain Scale 1: Numeric (0 - 10)  Pain Intensity 1: 4  Patient Stated Pain Goal: 0  Pain Location 1:  (bodyaches)  Pain Description 1: Aching  Pain Intervention(s) 1: Therapeutic presence      Wounds: No     Urinary catheter: voiding  Is there a henning order: No     LDAs:       Peripheral IV 07/17/21 Right Antecubital (Active)         Opportunity for questions and clarification was provided.     Sonam Currie RN

## 2021-07-18 NOTE — ED NOTES
Bedside and Verbal shift change report given to Becki HARRY RN (oncoming nurse) by this RN (offgoing nurse). Report included the following information SBAR.

## 2021-07-18 NOTE — PROGRESS NOTES
Problem: Airway Clearance - Ineffective  Goal: Achieve or maintain patent airway  Outcome: Progressing Towards Goal     Problem: Gas Exchange - Impaired  Goal: Absence of hypoxia  Outcome: Progressing Towards Goal  Goal: Promote optimal lung function  Outcome: Progressing Towards Goal     Problem: Breathing Pattern - Ineffective  Goal: Ability to achieve and maintain a regular respiratory rate  Outcome: Progressing Towards Goal     Problem:  Body Temperature -  Risk of, Imbalanced  Goal: Ability to maintain a body temperature within defined limits  Outcome: Progressing Towards Goal  Goal: Will regain or maintain usual level of consciousness  Outcome: Progressing Towards Goal  Goal: Complications related to the disease process, condition or treatment will be avoided or minimized  Outcome: Progressing Towards Goal     Problem: Isolation Precautions - Risk of Spread of Infection  Goal: Prevent transmission of infectious organism to others  Outcome: Progressing Towards Goal     Problem: Nutrition Deficits  Goal: Optimize nutrtional status  Outcome: Progressing Towards Goal     Problem: Risk for Fluid Volume Deficit  Goal: Maintain normal heart rhythm  Outcome: Progressing Towards Goal  Goal: Maintain absence of muscle cramping  Outcome: Progressing Towards Goal  Goal: Maintain normal serum potassium, sodium, calcium, phosphorus, and pH  Outcome: Progressing Towards Goal     Problem: Loneliness or Risk for Loneliness  Goal: Demonstrate positive use of time alone when socialization is not possible  Outcome: Progressing Towards Goal     Problem: Fatigue  Goal: Verbalize increase energy and improved vitality  Outcome: Progressing Towards Goal     Problem: Patient Education: Go to Patient Education Activity  Goal: Patient/Family Education  Outcome: Progressing Towards Goal     Problem: Patient Education: Go to Patient Education Activity  Goal: Patient/Family Education  Outcome: Progressing Towards Goal     Problem: Pneumonia: Day 1  Goal: Off Pathway (Use only if patient is Off Pathway)  Outcome: Progressing Towards Goal  Goal: Activity/Safety  Outcome: Progressing Towards Goal  Goal: Consults, if ordered  Outcome: Progressing Towards Goal  Goal: Diagnostic Test/Procedures  Outcome: Progressing Towards Goal  Goal: Nutrition/Diet  Outcome: Progressing Towards Goal  Goal: Medications  Outcome: Progressing Towards Goal  Goal: Respiratory  Outcome: Progressing Towards Goal  Goal: Treatments/Interventions/Procedures  Outcome: Progressing Towards Goal  Goal: Psychosocial  Outcome: Progressing Towards Goal  Goal: *Oxygen saturation within defined limits  Outcome: Progressing Towards Goal  Goal: *Influenza vaccine administered (October-March)  Outcome: Progressing Towards Goal  Goal: *Pneumoccocal vaccine administered  Outcome: Progressing Towards Goal  Goal: *Hemodynamically stable  Outcome: Progressing Towards Goal  Goal: *Demonstrates progressive activity  Outcome: Progressing Towards Goal  Goal: *Tolerating diet  Outcome: Progressing Towards Goal

## 2021-07-18 NOTE — ED PROVIDER NOTES
EMERGENCY DEPARTMENT HISTORY AND PHYSICAL EXAM      Date: 7/17/2021  Patient Name: Chris Escoto    History of Presenting Illness     Chief Complaint   Patient presents with    Positive For Covid-19     pt positive for Covid x 1 week. pt reports SOB, fever, chills, diarrhea, and poor apetitie. History Provided By: Patient    HPI: Chris Escoto, 47 y.o. female with PMHx significant for nothing who presents with a chief complaint of shortness of breath, cough, nausea, vomiting, diarrhea, and decreased p.o. intake in the setting of known COVID-19. Patient reports symptoms have been ongoing for about the last week ever since her diagnosis. She reports symptoms have been getting progressively worse. She reports body aches and chest pain with cough. Denies any abdominal pain or urinary symptoms. Reports  and child were also sick with COVID-19. PCP: None    There are no other complaints, changes, or physical findings at this time.     Current Facility-Administered Medications   Medication Dose Route Frequency Provider Last Rate Last Admin    ipratropium-albuterol (COMBIVENT RESPIMAT) 20 mcg-100 mcg inhalation spray  1 Puff Inhalation Q4H RT Mel Renteria MD   1 Puff at 07/17/21 2301    dexamethasone (DECADRON) 4 mg/mL injection 6 mg  6 mg IntraVENous DAILY Mel Renteria MD        levoFLOXacin (LEVAQUIN) 750 mg in D5W IVPB  750 mg IntraVENous Q24H Mel Renteria MD   IV Completed at 07/17/21 2120    sodium chloride (NS) flush 5-40 mL  5-40 mL IntraVENous Q8H Mel Renteria MD   10 mL at 07/17/21 2234    sodium chloride (NS) flush 5-40 mL  5-40 mL IntraVENous PRN Mel Renteria MD        acetaminophen (TYLENOL) tablet 650 mg  650 mg Oral Q6H PRN MD Luis Enrique Wild acetaminophen (TYLENOL) suppository 650 mg  650 mg Rectal Q6H PRN Mel Renteria MD        polyethylene glycol (MIRALAX) packet 17 g  17 g Oral DAILY PRN Mel Renteria MD        ondansetron (ZOFRAN ODT) tablet 4 mg  4 mg Oral Q8H PRN Daniel Bellamy MD        Or    ondansetron [facility]) injection 4 mg  4 mg IntraVENous Q6H PRN Daniel Bellamy MD        enoxaparin (LOVENOX) injection 40 mg  40 mg SubCUTAneous BID Daniel Bellamy MD        remdesivir 100 mg in 0.9% sodium chloride 250 mL IVPB  100 mg IntraVENous Q24H Daniel Bellamy MD         Past History     Past Medical History:  Past Medical History:   Diagnosis Date    Cholestasis of pregnancy 4/3/2013    Diabetes mellitus     HX OTHER MEDICAL     cholestasis or pregnancy     Past Surgical History:  No past surgical history on file. Family History:  No family history on file. Social History:  Social History     Tobacco Use    Smoking status: Never Smoker   Substance Use Topics    Alcohol use: No    Drug use: No     Allergies: Allergies   Allergen Reactions    Penicillins Rash    Benadryl [Diphenhydramine Hcl] Unknown (comments)     Stomach aches per patient     Review of Systems   Review of Systems   Constitutional: Positive for fever. Negative for chills. HENT: Negative for congestion, rhinorrhea and sore throat. Respiratory: Positive for cough and shortness of breath. Cardiovascular: Positive for chest pain. Gastrointestinal: Positive for diarrhea, nausea and vomiting. Negative for abdominal pain. Genitourinary: Negative for dysuria and urgency. Musculoskeletal: Positive for myalgias. Skin: Negative for rash. Neurological: Negative for dizziness, light-headedness and headaches. All other systems reviewed and are negative. Physical Exam   Physical Exam  Vitals and nursing note reviewed. Constitutional:       General: She is in acute distress. Appearance: She is well-developed. She is ill-appearing. HENT:      Head: Normocephalic and atraumatic. Eyes:      Conjunctiva/sclera: Conjunctivae normal.      Pupils: Pupils are equal, round, and reactive to light. Cardiovascular:      Rate and Rhythm: Regular rhythm.  Tachycardia present. Pulmonary:      Effort: Pulmonary effort is normal. No respiratory distress. Breath sounds: Normal breath sounds. No stridor. Abdominal:      General: There is no distension. Palpations: Abdomen is soft. Tenderness: There is no abdominal tenderness. Musculoskeletal:         General: Normal range of motion. Cervical back: Normal range of motion. Skin:     General: Skin is warm and dry. Neurological:      Mental Status: She is alert and oriented to person, place, and time.        Diagnostic Study Results   Labs -     Recent Results (from the past 12 hour(s))   EKG, 12 LEAD, INITIAL    Collection Time: 07/17/21  3:37 PM   Result Value Ref Range    Ventricular Rate 125 BPM    Atrial Rate 125 BPM    P-R Interval 144 ms    QRS Duration 84 ms    Q-T Interval 294 ms    QTC Calculation (Bezet) 424 ms    Calculated P Axis 49 degrees    Calculated R Axis -9 degrees    Calculated T Axis 22 degrees    Diagnosis       Sinus tachycardia  Possible Left atrial enlargement  Cannot rule out Anterior infarct , age undetermined  No previous ECGs available     POC LACTIC ACID    Collection Time: 07/17/21  3:55 PM   Result Value Ref Range    Lactic Acid (POC) 1.21 0.40 - 2.00 mmol/L   TROPONIN I    Collection Time: 07/17/21  4:03 PM   Result Value Ref Range    Troponin-I, Qt. <0.05 <0.05 ng/mL   CBC WITH AUTOMATED DIFF    Collection Time: 07/17/21  4:03 PM   Result Value Ref Range    WBC 6.4 3.6 - 11.0 K/uL    RBC 5.10 3.80 - 5.20 M/uL    HGB 13.5 11.5 - 16.0 g/dL    HCT 42.7 35.0 - 47.0 %    MCV 83.7 80.0 - 99.0 FL    MCH 26.5 26.0 - 34.0 PG    MCHC 31.6 30.0 - 36.5 g/dL    RDW 15.4 (H) 11.5 - 14.5 %    PLATELET 038 (L) 493 - 400 K/uL    MPV 10.1 8.9 - 12.9 FL    NRBC 0.0 0  WBC    ABSOLUTE NRBC 0.00 0.00 - 0.01 K/uL    NEUTROPHILS 82 (H) 32 - 75 %    BAND NEUTROPHILS 4 %    LYMPHOCYTES 12 12 - 49 %    MONOCYTES 2 (L) 5 - 13 %    EOSINOPHILS 0 0 - 7 %    BASOPHILS 0 0 - 1 %    IMMATURE GRANULOCYTES 0 0.0 - 0.5 %    ABS. NEUTROPHILS 5.5 1.8 - 8.0 K/UL    ABS. LYMPHOCYTES 0.8 0.8 - 3.5 K/UL    ABS. MONOCYTES 0.1 0.0 - 1.0 K/UL    ABS. EOSINOPHILS 0.0 0.0 - 0.4 K/UL    ABS. BASOPHILS 0.0 0.0 - 0.1 K/UL    ABS. IMM. GRANS. 0.0 0.00 - 0.04 K/UL    DF MANUAL      RBC COMMENTS NORMOCYTIC, NORMOCHROMIC      WBC COMMENTS REACTIVE LYMPHS     METABOLIC PANEL, COMPREHENSIVE    Collection Time: 07/17/21  4:03 PM   Result Value Ref Range    Sodium 138 136 - 145 mmol/L    Potassium 3.5 3.5 - 5.1 mmol/L    Chloride 102 97 - 108 mmol/L    CO2 31 21 - 32 mmol/L    Anion gap 5 5 - 15 mmol/L    Glucose 115 (H) 65 - 100 mg/dL    BUN 7 6 - 20 MG/DL    Creatinine 1.04 (H) 0.55 - 1.02 MG/DL    BUN/Creatinine ratio 7 (L) 12 - 20      GFR est AA >60 >60 ml/min/1.73m2    GFR est non-AA 59 (L) >60 ml/min/1.73m2    Calcium 8.5 8.5 - 10.1 MG/DL    Bilirubin, total 0.4 0.2 - 1.0 MG/DL    ALT (SGPT) 96 (H) 12 - 78 U/L    AST (SGOT) 90 (H) 15 - 37 U/L    Alk. phosphatase 174 (H) 45 - 117 U/L    Protein, total 8.4 (H) 6.4 - 8.2 g/dL    Albumin 3.6 3.5 - 5.0 g/dL    Globulin 4.8 (H) 2.0 - 4.0 g/dL    A-G Ratio 0.8 (L) 1.1 - 2.2     D DIMER    Collection Time: 07/17/21  7:04 PM   Result Value Ref Range    D-dimer 0.57 0.00 - 0.65 mg/L FEU       Radiologic Studies -   XR CHEST PORT   Final Result   Hypoinspiratory lungs with evidence of COVID 19 pneumonia. XR CHEST PORT    Result Date: 7/17/2021  Hypoinspiratory lungs with evidence of COVID 19 pneumonia. Medical Decision Making   I am the first provider for this patient. I reviewed the vital signs, available nursing notes, past medical history, past surgical history, family history and social history. Vital Signs-Reviewed the patient's vital signs.   Patient Vitals for the past 12 hrs:   Temp Pulse Resp BP SpO2   07/17/21 2302     95 %   07/17/21 2100 99 °F (37.2 °C) 98 28 121/85 95 %   07/17/21 1900  (!) 101 (!) 31  94 %   07/17/21 1830  99 (!) 31  95 % 07/17/21 1738  (!) 112 30  95 %   07/17/21 1737     (!) 88 %   07/17/21 1730  (!) 105 (!) 38 122/77 (!) 89 %   07/17/21 1720  (!) 109 (!) 32  91 %   07/17/21 1715  (!) 106 (!) 39  (!) 88 %   07/17/21 1645  (!) 116 (!) 39 125/81 90 %   07/17/21 1600  (!) 113 30 124/75 94 %   07/17/21 1533 100.3 °F (37.9 °C) (!) 134 (!) 33 115/88 94 %       Pulse Oximetry Analysis - 94% on ra    Cardiac Monitor:   Rate: 113 bpm  Rhythm: Sinus Tachycardia      ED EKG interpretation:  Rhythm: sinus tachycardia; and regular . Rate (approx.): 125; Axis: normal; P wave: normal; QRS interval: normal ; ST/T wave: normal; Other findings: borderline ekg. This EKG was interpreted by CHRISTOPHER Gilliam MD,ED Provider. Records Reviewed: Nursing Notes and Old Medical Records    Provider Notes (Medical Decision Making):   Patient presents with shortness of breath, cough, nausea, vomiting, body aches, in the setting of known COVID-19. On arrival she is tachycardic, tachypneic, with a temperature of 100.3. Ill-appearing but nontoxic. Suspect symptoms are related to known COVID-19. Will check basic lab work, chest x-ray, give IV fluids and antipyretics. ED Course:   Initial assessment performed. The patients presenting problems have been discussed, and they are in agreement with the care plan formulated and outlined with them. I have encouraged them to ask questions as they arise throughout their visit. While in the ED, patient's oxygen saturations dropped into the 80s at rest.  She was placed on nasal cannula with improved oxygen saturations. X-ray notable for Covid pneumonia. Tachycardia improved with IV fluids.   Given hypoxia, discussed with hospitalist, Dr. Judy Wilson, for admission         Procedures:  Procedures    Critical Care:  CRITICAL CARE NOTE :  IMPENDING DETERIORATION -Airway and Respiratory  ASSOCIATED RISK FACTORS - Hypotension and Hypoxia  MANAGEMENT- Bedside Assessment  INTERPRETATION -  Xrays, ECG and Blood Pressure  INTERVENTIONS - hemodynamic mngmt and O2  CASE REVIEW - Hospitalist/Intensivist and Nursing  TREATMENT RESPONSE -Stable  PERFORMED BY - Self    NOTES   :    I have spent 30 minutes of critical care time involved in lab review, consultations with specialist, family decision- making, bedside attention and documentation. During this entire length of time I was immediately available to the patient . Shantal Gonzalez MD      Disposition:    Admission Note:  Patient is being admitted to the hospital by Dr. Sonam Hernandez, Service: Hospitalist.  The results of their tests and reasons for their admission have been discussed with them and available family. They convey agreement and understanding for the need to be admitted and for their admission diagnosis. Diagnosis     Clinical Impression:   1. Pneumonia due to COVID-19 virus            Please note that this dictation was completed with [employer], the computer voice recognition software. Quite often unanticipated grammatical, syntax, homophones, and other interpretive errors are inadvertently transcribed by the computer software. Please disregard these errors.   Please excuse any errors that have escaped final proofreading

## 2021-07-19 ENCOUNTER — APPOINTMENT (OUTPATIENT)
Dept: NON INVASIVE DIAGNOSTICS | Age: 41
DRG: 871 | End: 2021-07-19
Attending: STUDENT IN AN ORGANIZED HEALTH CARE EDUCATION/TRAINING PROGRAM
Payer: COMMERCIAL

## 2021-07-19 PROBLEM — I44.1 2ND DEGREE AV BLOCK: Status: ACTIVE | Noted: 2021-07-19

## 2021-07-19 LAB
ANION GAP SERPL CALC-SCNC: 4 MMOL/L (ref 5–15)
ATRIAL RATE: 95 BPM
BUN SERPL-MCNC: 13 MG/DL (ref 6–20)
BUN/CREAT SERPL: 15 (ref 12–20)
CALCIUM SERPL-MCNC: 9 MG/DL (ref 8.5–10.1)
CALCULATED P AXIS, ECG09: 43 DEGREES
CALCULATED R AXIS, ECG10: -14 DEGREES
CALCULATED T AXIS, ECG11: 1 DEGREES
CHLORIDE SERPL-SCNC: 103 MMOL/L (ref 97–108)
CO2 SERPL-SCNC: 31 MMOL/L (ref 21–32)
CREAT SERPL-MCNC: 0.85 MG/DL (ref 0.55–1.02)
D DIMER PPP FEU-MCNC: 0.46 MG/L FEU (ref 0–0.65)
DIAGNOSIS, 93000: NORMAL
GLUCOSE SERPL-MCNC: 106 MG/DL (ref 65–100)
P-R INTERVAL, ECG05: 160 MS
POTASSIUM SERPL-SCNC: 4.3 MMOL/L (ref 3.5–5.1)
Q-T INTERVAL, ECG07: 358 MS
QRS DURATION, ECG06: 86 MS
QTC CALCULATION (BEZET), ECG08: 449 MS
SODIUM SERPL-SCNC: 138 MMOL/L (ref 136–145)
TROPONIN I SERPL-MCNC: <0.05 NG/ML
VENTRICULAR RATE, ECG03: 95 BPM

## 2021-07-19 PROCEDURE — 80048 BASIC METABOLIC PNL TOTAL CA: CPT

## 2021-07-19 PROCEDURE — 74011250636 HC RX REV CODE- 250/636: Performed by: GENERAL ACUTE CARE HOSPITAL

## 2021-07-19 PROCEDURE — 99223 1ST HOSP IP/OBS HIGH 75: CPT | Performed by: NURSE PRACTITIONER

## 2021-07-19 PROCEDURE — 77010033678 HC OXYGEN DAILY

## 2021-07-19 PROCEDURE — 93005 ELECTROCARDIOGRAM TRACING: CPT

## 2021-07-19 PROCEDURE — 94761 N-INVAS EAR/PLS OXIMETRY MLT: CPT

## 2021-07-19 PROCEDURE — APPSS180 APP SPLIT SHARED TIME > 60 MINUTES: Performed by: NURSE PRACTITIONER

## 2021-07-19 PROCEDURE — 94640 AIRWAY INHALATION TREATMENT: CPT

## 2021-07-19 PROCEDURE — 85379 FIBRIN DEGRADATION QUANT: CPT

## 2021-07-19 PROCEDURE — 74011000250 HC RX REV CODE- 250: Performed by: GENERAL ACUTE CARE HOSPITAL

## 2021-07-19 PROCEDURE — 65660000000 HC RM CCU STEPDOWN

## 2021-07-19 PROCEDURE — 74011000258 HC RX REV CODE- 258: Performed by: INTERNAL MEDICINE

## 2021-07-19 PROCEDURE — 84484 ASSAY OF TROPONIN QUANT: CPT

## 2021-07-19 PROCEDURE — 74011250637 HC RX REV CODE- 250/637: Performed by: GENERAL ACUTE CARE HOSPITAL

## 2021-07-19 PROCEDURE — 74011250637 HC RX REV CODE- 250/637: Performed by: STUDENT IN AN ORGANIZED HEALTH CARE EDUCATION/TRAINING PROGRAM

## 2021-07-19 PROCEDURE — 74011000258 HC RX REV CODE- 258: Performed by: GENERAL ACUTE CARE HOSPITAL

## 2021-07-19 PROCEDURE — 74011250636 HC RX REV CODE- 250/636: Performed by: INTERNAL MEDICINE

## 2021-07-19 PROCEDURE — 36415 COLL VENOUS BLD VENIPUNCTURE: CPT

## 2021-07-19 RX ORDER — DIPHENHYDRAMINE HYDROCHLORIDE 50 MG/ML
50 INJECTION, SOLUTION INTRAMUSCULAR; INTRAVENOUS
Status: DISPENSED | OUTPATIENT
Start: 2021-07-19 | End: 2021-07-20

## 2021-07-19 RX ORDER — EPINEPHRINE 1 MG/ML
0.3 INJECTION, SOLUTION, CONCENTRATE INTRAVENOUS
Status: DISCONTINUED | OUTPATIENT
Start: 2021-07-19 | End: 2021-07-20

## 2021-07-19 RX ADMIN — IPRATROPIUM BROMIDE AND ALBUTEROL 1 PUFF: 20; 100 SPRAY, METERED RESPIRATORY (INHALATION) at 07:09

## 2021-07-19 RX ADMIN — ENOXAPARIN SODIUM 40 MG: 40 INJECTION SUBCUTANEOUS at 20:32

## 2021-07-19 RX ADMIN — TOCILIZUMAB 800 MG: 20 INJECTION, SOLUTION, CONCENTRATE INTRAVENOUS at 13:37

## 2021-07-19 RX ADMIN — IPRATROPIUM BROMIDE AND ALBUTEROL 1 PUFF: 20; 100 SPRAY, METERED RESPIRATORY (INHALATION) at 14:16

## 2021-07-19 RX ADMIN — DEXAMETHASONE SODIUM PHOSPHATE 6 MG: 4 INJECTION, SOLUTION INTRAMUSCULAR; INTRAVENOUS at 08:45

## 2021-07-19 RX ADMIN — LEVOFLOXACIN 750 MG: 5 INJECTION, SOLUTION INTRAVENOUS at 17:50

## 2021-07-19 RX ADMIN — Medication 10 ML: at 21:51

## 2021-07-19 RX ADMIN — OXYCODONE HYDROCHLORIDE AND ACETAMINOPHEN 500 MG: 500 TABLET ORAL at 17:50

## 2021-07-19 RX ADMIN — OXYCODONE HYDROCHLORIDE AND ACETAMINOPHEN 500 MG: 500 TABLET ORAL at 08:44

## 2021-07-19 RX ADMIN — REMDESIVIR 100 MG: 100 INJECTION, POWDER, LYOPHILIZED, FOR SOLUTION INTRAVENOUS at 21:46

## 2021-07-19 RX ADMIN — ZINC SULFATE 220 MG (50 MG) CAPSULE 1 CAPSULE: CAPSULE at 08:44

## 2021-07-19 RX ADMIN — ENOXAPARIN SODIUM 40 MG: 40 INJECTION SUBCUTANEOUS at 08:45

## 2021-07-19 RX ADMIN — GUAIFENESIN 600 MG: 600 TABLET, EXTENDED RELEASE ORAL at 08:44

## 2021-07-19 RX ADMIN — Medication 10 ML: at 05:03

## 2021-07-19 RX ADMIN — Medication 10 ML: at 13:35

## 2021-07-19 RX ADMIN — GUAIFENESIN 600 MG: 600 TABLET, EXTENDED RELEASE ORAL at 20:32

## 2021-07-19 RX ADMIN — IPRATROPIUM BROMIDE AND ALBUTEROL 1 PUFF: 20; 100 SPRAY, METERED RESPIRATORY (INHALATION) at 02:41

## 2021-07-19 RX ADMIN — IPRATROPIUM BROMIDE AND ALBUTEROL 1 PUFF: 20; 100 SPRAY, METERED RESPIRATORY (INHALATION) at 19:47

## 2021-07-19 NOTE — PROGRESS NOTES
Problem: Airway Clearance - Ineffective  Goal: Achieve or maintain patent airway  Outcome: Progressing Towards Goal     Problem: Gas Exchange - Impaired  Goal: Absence of hypoxia  Outcome: Progressing Towards Goal  Goal: Promote optimal lung function  Outcome: Progressing Towards Goal     Problem: Breathing Pattern - Ineffective  Goal: Ability to achieve and maintain a regular respiratory rate  Outcome: Progressing Towards Goal     Problem:  Body Temperature -  Risk of, Imbalanced  Goal: Ability to maintain a body temperature within defined limits  Outcome: Progressing Towards Goal  Goal: Will regain or maintain usual level of consciousness  Outcome: Progressing Towards Goal  Goal: Complications related to the disease process, condition or treatment will be avoided or minimized  Outcome: Progressing Towards Goal     Problem: Isolation Precautions - Risk of Spread of Infection  Goal: Prevent transmission of infectious organism to others  Outcome: Progressing Towards Goal     Problem: Nutrition Deficits  Goal: Optimize nutrtional status  Outcome: Progressing Towards Goal     Problem: Risk for Fluid Volume Deficit  Goal: Maintain normal heart rhythm  Outcome: Progressing Towards Goal  Goal: Maintain absence of muscle cramping  Outcome: Progressing Towards Goal  Goal: Maintain normal serum potassium, sodium, calcium, phosphorus, and pH  Outcome: Progressing Towards Goal     Problem: Loneliness or Risk for Loneliness  Goal: Demonstrate positive use of time alone when socialization is not possible  Outcome: Progressing Towards Goal     Problem: Fatigue  Goal: Verbalize increase energy and improved vitality  Outcome: Progressing Towards Goal     Problem: Patient Education: Go to Patient Education Activity  Goal: Patient/Family Education  Outcome: Progressing Towards Goal     Problem: Patient Education: Go to Patient Education Activity  Goal: Patient/Family Education  Outcome: Progressing Towards Goal     Problem: Pneumonia: Day 1  Goal: Off Pathway (Use only if patient is Off Pathway)  Outcome: Progressing Towards Goal  Goal: Activity/Safety  Outcome: Progressing Towards Goal  Goal: Consults, if ordered  Outcome: Progressing Towards Goal  Goal: Diagnostic Test/Procedures  Outcome: Progressing Towards Goal  Goal: Nutrition/Diet  Outcome: Progressing Towards Goal  Goal: Medications  Outcome: Progressing Towards Goal  Goal: Respiratory  Outcome: Progressing Towards Goal  Goal: Treatments/Interventions/Procedures  Outcome: Progressing Towards Goal  Goal: Psychosocial  Outcome: Progressing Towards Goal  Goal: *Oxygen saturation within defined limits  Outcome: Progressing Towards Goal  Goal: *Influenza vaccine administered (October-March)  Outcome: Progressing Towards Goal  Goal: *Pneumoccocal vaccine administered  Outcome: Progressing Towards Goal  Goal: *Hemodynamically stable  Outcome: Progressing Towards Goal  Goal: *Demonstrates progressive activity  Outcome: Progressing Towards Goal  Goal: *Tolerating diet  Outcome: Progressing Towards Goal     Problem: Pneumonia: Day 2  Goal: Off Pathway (Use only if patient is Off Pathway)  Outcome: Progressing Towards Goal  Goal: Activity/Safety  Outcome: Progressing Towards Goal  Goal: Consults, if ordered  Outcome: Progressing Towards Goal  Goal: Diagnostic Test/Procedures  Outcome: Progressing Towards Goal  Goal: Nutrition/Diet  Outcome: Progressing Towards Goal  Goal: Discharge Planning  Outcome: Progressing Towards Goal  Goal: Medications  Outcome: Progressing Towards Goal  Goal: Respiratory  Outcome: Progressing Towards Goal  Goal: Treatments/Interventions/Procedures  Outcome: Progressing Towards Goal  Goal: Psychosocial  Outcome: Progressing Towards Goal  Goal: *Oxygen saturation within defined limits  Outcome: Progressing Towards Goal  Goal: *Hemodynamically stable  Outcome: Progressing Towards Goal  Goal: *Demonstrates progressive activity  Outcome: Progressing Towards Goal  Goal: *Tolerating diet  Outcome: Progressing Towards Goal  Goal: *Optimal pain control at patient's stated goal  Outcome: Progressing Towards Goal     Problem: Pneumonia: Day 3  Goal: Off Pathway (Use only if patient is Off Pathway)  Outcome: Progressing Towards Goal  Goal: Activity/Safety  Outcome: Progressing Towards Goal  Goal: Consults, if ordered  Outcome: Progressing Towards Goal  Goal: Diagnostic Test/Procedures  Outcome: Progressing Towards Goal  Goal: Nutrition/Diet  Outcome: Progressing Towards Goal  Goal: Discharge Planning  Outcome: Progressing Towards Goal  Goal: Medications  Outcome: Progressing Towards Goal  Goal: Respiratory  Outcome: Progressing Towards Goal  Goal: Treatments/Interventions/Procedures  Outcome: Progressing Towards Goal  Goal: Psychosocial  Outcome: Progressing Towards Goal  Goal: *Oxygen saturation within defined limits  Outcome: Progressing Towards Goal  Goal: *Hemodynamically stable  Outcome: Progressing Towards Goal  Goal: *Demonstrates progressive activity  Outcome: Progressing Towards Goal  Goal: *Tolerating diet  Outcome: Progressing Towards Goal  Goal: *Describes available resources and support systems  Outcome: Progressing Towards Goal  Goal: *Optimal pain control at patient's stated goal  Outcome: Progressing Towards Goal     Problem: Pneumonia: Day 4  Goal: Off Pathway (Use only if patient is Off Pathway)  Outcome: Progressing Towards Goal  Goal: Activity/Safety  Outcome: Progressing Towards Goal  Goal: Nutrition/Diet  Outcome: Progressing Towards Goal  Goal: Discharge Planning  Outcome: Progressing Towards Goal  Goal: Medications  Outcome: Progressing Towards Goal  Goal: Respiratory  Outcome: Progressing Towards Goal  Goal: Treatments/Interventions/Procedures  Outcome: Progressing Towards Goal  Goal: Psychosocial  Outcome: Progressing Towards Goal     Problem: Pneumonia: Discharge Outcomes  Goal: *Demonstrates progressive activity  Outcome: Progressing Towards Goal  Goal: *Describes follow-up/return visits to physicians  Outcome: Progressing Towards Goal  Goal: *Tolerating diet  Outcome: Progressing Towards Goal  Goal: *Verbalizes name, dosage, time, side effects, and number of days to continue medications  Outcome: Progressing Towards Goal  Goal: *Influenza immunization  Outcome: Progressing Towards Goal  Goal: *Pneumococcal immunization  Outcome: Progressing Towards Goal  Goal: *Respiratory status at baseline  Outcome: Progressing Towards Goal  Goal: *Vital signs within defined limits  Outcome: Progressing Towards Goal  Goal: *Describes available resources and support systems  Outcome: Progressing Towards Goal  Goal: *Optimal pain control at patient's stated goal  Outcome: Progressing Towards Goal

## 2021-07-19 NOTE — PROGRESS NOTES
End of Shift Note    Bedside shift change report given to  Eleni Rios 43  (oncoming nurse) by Tomy Haley RN (offgoing nurse). Report included the following information SBAR, Kardex, Intake/Output and MAR    Shift worked: night   Shift summary and any significant changes:     IV flush  COVID positive   Active non productive cough  No success with am lab  Called vascular team and left a message      Concerns for physician to address: Vascular team to draw AM lab   Zone phone for oncoming shift:        Patient Information  Rosalee Mccray  47 y.o.  7/17/2021  3:40 PM by Cortez Shoemaker MD. Rosalee Mccray was admitted from Home    Problem List  Patient Active Problem List    Diagnosis Date Noted    Pneumonia due to COVID-19 virus 07/17/2021    Cholestasis of pregnancy 04/03/2013    Hypertension complicating pregnancy 93/94/2503     Past Medical History:   Diagnosis Date    Cholestasis of pregnancy 4/3/2013    Diabetes mellitus     HX OTHER MEDICAL     cholestasis or pregnancy       Core Measures:  CVA: No No  PNA:Yes Yes    Activity:  Activity Level: Up ad donovan, Bath Room Privileges  Number times ambulated in hallways past shift: 0  Number of times OOB to chair past shift: 3    Cardiac:   Cardiac Monitoring: Yes      Cardiac Rhythm: Sinus Rhythm    Access:   Current line(s): PIV     Genitourinary:   Urinary status: voiding      Respiratory:   O2 Device: Nasal cannula           GI:     Current diet:  ADULT DIET Regular  Passing flatus: YES  Tolerating current diet: YES       Pain Management:   Patient states pain is manageable on current regimen: YES    Skin:  James Score: 22  Interventions: PT/OT consult    Patient Safety:  Fall Score:  Total Score: 0  Interventions: bed/chair alarm     @Rollbelt  @dexterity to release roll belt  Yes/No ( must document dexterity  here by stating Yes or No here, otherwise this is a restraint and must follow restraint documentation policy.)    DVT prophylaxis:  DVT prophylaxis Med- Yes  DVT prophylaxis SCD or STEPHANIE- No     Wounds: (If Applicable)  Wounds- No      Active Consults:  IP CONSULT TO PULMONOLOGY    Length of Stay:  Expected LOS: - - -  Actual LOS: 2  Discharge Plan: Yes       Adelia Zaman RN

## 2021-07-19 NOTE — PROGRESS NOTES
End of Shift Note    Bedside shift change report given to Matthew (oncoming nurse) by Samm Cole (offgoing nurse). Report included the following information SBAR, Kardex, Intake/Output, MAR, Accordion and Recent Results    Shift worked:  7-7p     Shift summary and any significant changes:     on 6L O2. Actemra given and tolerated. Cardiology consulted for 4 second pause this AM.      Concerns for physician to address:  Pacemaker?  Montioring O2     Zone phone for oncoming shift:   [phone]

## 2021-07-19 NOTE — CONSULTS
PULMONARY MEDICINE      Name: Elliot Littlejohn   : 1980   MRN: [id number]   Date: 2021      Subjective:     No acute events overnight  No severe distress  Still on O2  Still with sob    Review of Systems:     A comprehensive 12 system review of systems was negative except for as documented in HPI    Assessment:     Acute hypoxic pulmonary insufficiency- on 2 L/min O2  Pneumonia due to COVID 19 virus  Increased BMI  Other medical problems per chart    Recommendations:   On O2- wean as tolerated  IV remdesivir-- if clinical condition worsens, consider a 10 day course  IV decadron  Will order Tocilizumab  On antibiotics  On lovenox  Prone positioning as tolerated  Incentive spirometer  Patient is acutely ill, at risk for further decline- monitor clinical course closely  D/W patient        Active Problem List:     Problem List  Date Reviewed: 8/15/2016        Codes Class    Pneumonia due to COVID-19 virus ICD-10-CM: U07.1, J12.82  ICD-9-CM: 480.8, 079.89         Cholestasis of pregnancy ICD-10-CM: O26.619, K83.1  ICD-9-CM: 646.70         Hypertension complicating pregnancy KKA-09-VD: O16.9  ICD-9-CM: 642.90               Past Medical History:      has a past medical history of Cholestasis of pregnancy (4/3/2013), Diabetes mellitus, and OTHER MEDICAL.  She also has no past medical history of Abnormal Pap smear, Acquired hypothyroidism, Anemia NEC, Asthma, Complication of anesthesia, Essential hypertension, Genital herpes, unspecified, Heart abnormalities, Herpes gestationis, Herpes simplex without mention of complication, Human immunodeficiency virus (HIV) disease ([facility] [address] Infertility, Kidney disease, Phlebitis and thrombophlebitis of unspecified site, Postpartum depression, Psychiatric problem, Rhesus isoimmunization unspecified as to episode of care in pregnancy, Sickle-cell disease, unspecified, Systemic lupus erythematosus ([address] Trauma, Unspecified breast disorder, Unspecified diseases of blood and blood-forming organs, or Unspecified epilepsy without mention of intractable epilepsy ([facility] [address] Past Surgical History:      has no past surgical history on file. Home Medications:     Prior to Admission medications    Not on File       Allergies/Social/Family History: Allergies   Allergen Reactions    Penicillins Rash    Benadryl [Diphenhydramine Hcl] Unknown (comments)     Stomach aches per patient      Social History     Tobacco Use    Smoking status: Never Smoker   Substance Use Topics    Alcohol use: No      No family history on file. Objective:   Vital Signs:  Visit Vitals  /84   Pulse 90   Temp 98.2 °F (36.8 °C)   Resp 20   Ht 5' 7\" (1.702 m)   Wt 138 kg (304 lb 3.8 oz)   SpO2 91%   BMI 47.65 kg/m²    O2 Flow Rate (L/min): 5 l/min O2 Device: Nasal cannula Temp (24hrs), Av.4 °F (36.9 °C), Min:98 °F (36.7 °C), Max:98.8 °F (37.1 °C)           Intake/Output:   No intake or output data in the 24 hours ending 21 1003    Physical Exam: reviewed  Conversing with incomplete sentences        LABS AND  DATA: Personally reviewed  Recent Labs     21  0333 21  1603   WBC 6.2 6.4   HGB 12.2 13.5   HCT 38.8 42.7    141*     Recent Labs     21  0725 21  0333    138   K 4.3 3.7    104   CO2 31 28   BUN 13 7   CREA 0.85 0.84   * 112*   CA 9.0 8.3*   MG  --  1.9   PHOS  --  2.1*     Recent Labs     21  0333 21  1603   * 174*   TP 7.3 8.4*   ALB 3.1* 3.6   GLOB 4.2* 4.8*     No results for input(s): INR, PTP, APTT, INREXT, INREXT in the last 72 hours. No results for input(s): PHI, PCO2I, PO2I, FIO2I in the last 72 hours.   Recent Labs     21  1603   TROIQ <0.05       MEDS: Reviewed    Chest Imaging: personally reviewed and report checked    Tele- reviewed    Medical decision making:   I have reviewed the flowsheet and previous day's notes  Patient has acute or chronic illness that poses a threat to life or bodily function  Review and order of Clinical lab tests  Review and Order of Radiology tests  Independent visualization of Image  Reviewed Oxygen  High Risk Drug therapy requiring intensive monitoring for toxicity: eg steroids, pressors, antibiotics        Thank you for allowing me to participate in this patient's care.     Luci Crespo MD      [facility] [address] [place] [address]

## 2021-07-19 NOTE — PROGRESS NOTES
Transition of Care Plan:  RUR: 9%  Disposition: home with family   Follow up appointments: PCP  DME needed: TBD pending O2 needs at d/c  Transportation at Discharge:    [employer] or means to access home:  to provide      IM Medicare letter: n/a commercial insurance   Caregiver Contact: mother Reji Chaparro [phone]  Discharge Caregiver contacted prior to discharge? To be contacted prior to d/c           Reason for Admission:  COVID-19 PNA                   RUR Score: 9%                    Plan for utilizing home health:  Per recommendation         PCP: First and Last name:  Unable to recall name   Name of Practice: [facility]   Are you a current patient: Yes/No: yes   Approximate date of last visit: March 2021- was first visit    Can you participate in a virtual visit with your PCP: yes                    Current Advanced Directive/Advance Care Plan: Jono Louise (ACP) Conversation      Date of Conversation: 7/19/21  Conducted with: Patient with [address] Decision Maker:     Click here to complete [address] including selection of the [address] Relationship (ie \"Primary\")  Today we documented Decision Maker(s) consistent with Legal Next of Kin hierarchy. Content/Action Overview:   DECLINED ACP conversation - will revisit periodically   Reviewed DNR/DNI and patient elects Full Code (Attempt Resuscitation)    Healthcare Decision Maker:   Click here to complete [address] including selection of the Healthcare Decision Maker Relationship (ie \"Primary\")                         Transition of Care Plan:                      CM contacted patient via bed phone to complete initial assessment due to pt having COVID-19. Pt confirmed demographics, insurance, and emergency contact on file. Pt uses [facility] on [address]  Pt, , and 3 children (ages 23, 13, and 6) live in a single level home with 5 aden. Pt has no DME. At baseline pt is fully independent with ADLs and driving. Pt has no hx of HH or rehab. Pt's plan is to d/c home with home health. Pt reported her  will be transporting her home at time of d/c. Care Management Interventions  PCP Verified by CM: Yes (last seen in March 2021)  Mode of Transport at Discharge:  Other (see comment) ( )  Transition of Care Consult (CM Consult): Discharge Planning  Physical Therapy Consult: No  Occupational Therapy Consult: No  Speech Therapy Consult: No  Current Support Network: Lives with Spouse, Family Lives Nearby, Own Home (Pt, , and 3 children (ages 23, 13, and 6) live in a single level home with 5 aden. )  Confirm Follow Up Transport: Family  Discharge Location  Discharge Placement: Home with family assistance    Servando Nolen, [address]

## 2021-07-19 NOTE — PROGRESS NOTES
Hospitalist Progress Note    NAME: Katlyn Barrera   :  1980   MRN:  [id number]       Assessment / Plan:  Acute hypoxic respiratory failure secondary to COVID-19 PNA  Sepsis  Patient oxygen requirements increased to 5 L nasal cannula, wean as tolerated. .  On remdesivir, dexamethasone, Levaquin. Pulmonary Consultappreciate recommendations. IV steroids, bronchodilators  D-dimer 0.46, no need for further checks. Lovenox 40 mg twice daily  Procalcitonin 0.18 so we will continue the antibiotics  Empiric abx  Combivent 4 times daily  Mucinex twice daily  Vitamin C and zinc added. Incentive spirometer    Sinus pause x2  Second-degree AV block  Avoid beta-blocker. Echo ordered. Cardiology on boardlikely going to consult Dr. Adrian Flowers for the input. 40 or above Morbid obesity / Body mass index is 47.65 kg/m². Estimated discharge date: TBD  Barriers:    Code status: Full  Prophylaxis: Lovenox  Recommended Disposition: Home w/Family     Subjective:     Chief Complaint / Reason for Physician Visit  Patient seen today, says her shortness of breath is very much the same as of yesterday, currently on 5 L nasal cannula. Discussed with RN events overnight. Review of Systems:  Symptom Y/N Comments  Symptom Y/N Comments   Fever/Chills n   Chest Pain n    Poor Appetite y   Edema     Cough y   Abdominal Pain n    Sputum y   Joint Pain     SOB/BROOKS y   Pruritis/Rash     Nausea/vomit n   Tolerating PT/OT     Diarrhea n   Tolerating Diet     Constipation    Other       Could NOT obtain due to:      Objective:     VITALS:   Last 24hrs VS reviewed since prior progress note.  Most recent are:  Patient Vitals for the past 24 hrs:   Temp Pulse Resp BP SpO2   21 0739 98.4 °F (36.9 °C) 94 20 130/85 90 %   21 0734 98.2 °F (36.8 °C) 90 20 120/84 91 %   21 0709     92 %   21 0532 98.8 °F (37.1 °C) 97 19 118/78 91 %   21 0238     91 %   21 2250 98 °F (36.7 °C) 98 19 (!) 140/88 93 %   07/18/21 2107     93 %   07/18/21 2055 98.4 °F (36.9 °C) (!) 104 20 (!) 149/91 92 %   07/18/21 1637 98.4 °F (36.9 °C) (!) 103 24 120/77 92 %   07/18/21 1600  92        No intake or output data in the 24 hours ending 07/19/21 1335     I had a face to face encounter and independently examined this patient on 7/19/2021, as outlined below:  PHYSICAL EXAM:  General: WD, WN. Alert, cooperative, no acute distress    EENT:  EOMI. Anicteric sclerae. MMM  Resp:  Decreased breath sounds bilaterally, no wheezing or rales. No accessory muscle use  CV:  Regular  rhythm,  No edema  GI:  Soft, Non distended, Non tender. +Bowel sounds  Neurologic:  Alert and oriented X 3, normal speech,   Psych:   Good insight. Not anxious nor agitated  Skin:  No rashes. No jaundice    Reviewed most current lab test results and cultures  YES  Reviewed most current radiology test results   YES  Review and summation of old records today    NO  Reviewed patient's current orders and MAR    YES  PMH/ reviewed - no change compared to H&P  ________________________________________________________________________  Care Plan discussed with:    Comments   Patient x    Family      RN x    Care Manager     Consultant  x                      Multidiciplinary team rounds were held today with , nursing, pharmacist and clinical coordinator. Patient's plan of care was discussed; medications were reviewed and discharge planning was addressed.      ________________________________________________________________________  Total NON critical care TIME:  40   Minutes    Total CRITICAL CARE TIME Spent:   Minutes non procedure based      Comments   >50% of visit spent in counseling and coordination of care     ________________________________________________________________________  Lieutenant Carmine MD     Procedures: see electronic medical records for all procedures/Xrays and details which were not copied into this note but were reviewed prior to creation of Plan. LABS:  I reviewed today's most current labs and imaging studies.   Pertinent labs include:  Recent Labs     07/18/21  0333 07/17/21  1603   WBC 6.2 6.4   HGB 12.2 13.5   HCT 38.8 42.7    141*     Recent Labs     07/19/21  0725 07/18/21  0333 07/17/21  1603    138 138   K 4.3 3.7 3.5    104 102   CO2 31 28 31   * 112* 115*   BUN 13 7 7   CREA 0.85 0.84 1.04*   CA 9.0 8.3* 8.5   MG  --  1.9  --    PHOS  --  2.1*  --    ALB  --  3.1* 3.6   TBILI  --  0.6 0.4   ALT  --  96* 96*       Signed: Kalie Priest MD

## 2021-07-19 NOTE — CONSULTS
EP/ ARRHYTHMIA CONSULT    Patient ID:  Patient: Carina Bentley  MRN: [id number]  Age: 47 y.o.  : 1980    Date of  Admission: 2021  3:40 PM   PCP:  None    Assessment: 1. Likely vagally-mediated/reflexive slowing of sinus discharge and AV edgar conduction. May not know what triggered this, coughing?, no evidence of progression. I would doubt this is due to myocarditis. Troponin negative. 2. COVID-19 pneumonia with significant hypoxia. Unvaccinated. 3. Diabetes mellitus type 2.  4. Obesity. 5. Full code. Plan:     1. At this point, would watch her further on tele, but do nothing otherwise. No particular treatment is needed even for recurrent and self-limited episodes. 2. I'd avoid atropine for transient bradycardia as a significantly-elevated HR in the setting of significant hypoxia could potentially provoke cardiac ischemia. 3. Agree with DVT prophylaxis. 4. Echo is pending. I'll be available if needed for a worsening arrhythmia picture, [address] will follow and notify me of such. [x]       High complexity decision making was performed in this patient at high risk for decompensation multiple organ involvement. Carina Bentley is a 47 y.o. female with a history of pneumonia with COVID-19 infection. She had two episodes of pauses in the 4-6 second range. She is unaware of these. Review of the tele shows sinus slowing and then AV block, then restoration of the normal rhythm. As aforementioned, no syncope, dizziness. No palpitations. No TIA or stroke symptoms. No chest, neck, jaw, shoulder, or arm pain with stress or exertion. She has been placed on higher levels of oxygen while here. Her CXR shows bilateral infiltrates. I answered all her questions.        Past Medical History:   Diagnosis Date    Cholestasis of pregnancy 4/3/2013    Diabetes mellitus     HX OTHER MEDICAL     cholestasis or pregnancy        No past surgical history on file. Social History     Tobacco Use    Smoking status: Never Smoker   Substance Use Topics    Alcohol use: No        No family history on file.      Allergies   Allergen Reactions    Penicillins Rash    Benadryl [Diphenhydramine Hcl] Unknown (comments)     Stomach aches per patient          Current Facility-Administered Medications   Medication Dose Route Frequency    diphenhydrAMINE (BENADRYL) injection 50 mg  50 mg IntraVENous ONCE PRN    famotidine (PF) (PEPCID) 20 mg in 0.9% sodium chloride 10 mL injection  20 mg IntraVENous ONCE PRN    methylPREDNISolone (PF) (Solu-MEDROL) injection 125 mg  125 mg IntraVENous ONCE PRN    EPINEPHrine HCl (PF) (ADRENALIN) 1 mg/mL (1 mL) injection 0.3 mg  0.3 mg IntraMUSCular Q10MIN PRN    [Held by provider] COVID-19 vac,Ad26-PF ([employer]) injection 1 Dose  1 Dose IntraMUSCular PRIOR TO DISCHARGE    guaiFENesin ER (MUCINEX) tablet 600 mg  600 mg Oral Q12H    ascorbic acid (vitamin C) (VITAMIN C) tablet 500 mg  500 mg Oral BID    zinc sulfate (ZINCATE) 50 mg zinc (220 mg) capsule 1 Capsule  1 Capsule Oral DAILY    ipratropium-albuterol (COMBIVENT RESPIMAT) 20 mcg-100 mcg inhalation spray  1 Puff Inhalation Q6H RT    dexamethasone (DECADRON) 4 mg/mL injection 6 mg  6 mg IntraVENous DAILY    levoFLOXacin (LEVAQUIN) 750 mg in D5W IVPB  750 mg IntraVENous Q24H    sodium chloride (NS) flush 5-40 mL  5-40 mL IntraVENous Q8H    sodium chloride (NS) flush 5-40 mL  5-40 mL IntraVENous PRN    acetaminophen (TYLENOL) tablet 650 mg  650 mg Oral Q6H PRN    Or    acetaminophen (TYLENOL) suppository 650 mg  650 mg Rectal Q6H PRN    polyethylene glycol (MIRALAX) packet 17 g  17 g Oral DAILY PRN    ondansetron (ZOFRAN ODT) tablet 4 mg  4 mg Oral Q8H PRN    Or    ondansetron (ZOFRAN) injection 4 mg  4 mg IntraVENous Q6H PRN    enoxaparin (LOVENOX) injection 40 mg  40 mg SubCUTAneous BID    remdesivir 100 mg in 0.9% sodium chloride 250 mL IVPB  100 mg IntraVENous Q24H       Review of Symptoms:  See HPI as well. General: negative for weight loss or weight gain  Eyes: negative for blurred vision, eye pain, loss of vision, diplopia   Ear Nose and Throat: negative for rhinorrhea, pharyngitis, otalgia, tinnitus, speech or swallowing difficulties   Respiratory: POSITIVE for SOB, cough, sputum production, wheezing, BROOKS, negative for hemoptysis or pleuritic pain   Cardiology: negative for palpitations, orthopnea, PND, edema, syncope   Gastrointestinal: negative for abdominal pain, N/V, dysphagia, change in bowel habits, bleeding   Genitourinary: negative for frequency, urgency, dysuria, hematuria, incontinence   Muskuloskeletal : negative for arthralgia, myalgia   Hematology: negative for easy bruising, bleeding, lymphadenopathy   Dermatological: negative for rash, ulceration, mole change, new lesion   Endocrine: negative for hot flashes or polydipsia   Neurological: negative for headache, dizziness, confusion, focal weakness, paresthesia, memory loss, gait disturbance   Psychological: negative for anxiety, depression, agitation       Objective:      Physical Exam:  Temp (24hrs), Av.4 °F (36.9 °C), Min:98 °F (36.7 °C), Max:98.8 °F (37.1 °C)    Patient Vitals for the past 8 hrs:   Pulse   21 1615 78   21 1446 (!) 107   21 1337 96    Patient Vitals for the past 8 hrs:   Resp   21 1446 26   21 1337 28    Patient Vitals for the past 8 hrs:   BP   21 1446 (!) 136/93   21 1337 (!) 124/90      No intake or output data in the 24 hours ending 21    Nondiaphoretic, not in acute distress, but on nonrebreather. Supple, no palpable thyromegaly, nontender. No scleral icterus, mucous membranes moist, conjuctivae pink, no xanthelasma. Unlabored, symmetric air movement. Regular rate and rhythm, no murmur, pericardial rub, knock, or gallop. No JVD or peripheral edema. Palpable radial pulses bilaterally.   Abdomen obese, soft, nontender, nondistended. Extremities without cyanosis or clubbing. Muscle tone and bulk normal for age. Skin warm and dry. No rashes or ulcers. Neuro grossly nonfocal.  No tremor. Awake and appropriate. CARDIOGRAPHICS and STUDIES, I reviewed:    Telemetry:   SR with two pauses were noted. Then pattern is of sinus slowing with progressive AV block and then reconstitution of both. Currently sinus rhythm 80's. ECG:   Sinus rhythm with normal axis and NH interval.    Echo:  PENDING. Labs:  Recent Labs     07/19/21  0911 07/17/21  1603   TROIQ <0.05 <0.05     No results found for: CHOL, CHOLX, CHLST, CHOLV, HDL, HDLP, LDL, LDLC, DLDLP, TGLX, TRIGL, TRIGP, CHHD, CHHDX  No results for input(s): INR, PTP, APTT, INREXT in the last 72 hours. Recent Labs     07/19/21  0725 07/18/21  0333 07/17/21  1603    138 138   K 4.3 3.7 3.5    104 102   CO2 31 28 31   BUN 13 7 7   CREA 0.85 0.84 1.04*   * 112* 115*   PHOS  --  2.1*  --    CA 9.0 8.3* 8.5   ALB  --  3.1* 3.6   WBC  --  6.2 6.4   HGB  --  12.2 13.5   HCT  --  38.8 42.7   PLT  --  151 141*     Recent Labs     07/18/21  0333 07/17/21  1603   * 174*   TP 7.3 8.4*   ALB 3.1* 3.6   GLOB 4.2* 4.8*     No components found for: GLPOC  No results for input(s): PH, PCO2, PO2 in the last 72 hours.         Soraya Jordan MD  7/19/2021

## 2021-07-19 NOTE — CONSULTS
[facility]     Date of  Admission: 7/17/2021  3:40 PM     Admission type:Emergency    Referral for: sinus pauses  Referral by:  Hospitalist      Subjective: Katlyn Barrera is a 47 y.o. female with no significant PMH who was admitted for Pneumonia due to COVID-19 virus [U07.1, J12.82]. Per ED provider note Katlyn Barrera presented to the ED with c/o worsening SOB after being recently diagnosed with Covid 19. Referred to cardiology for sinus pauses on telemetry. Tele review shows two pauses of 3.51 seconds and 4.17 seconds. On assessment, Carol Kingsley endorses SOB, that is slightly improved since admission. No c/o pain, palpitations, dizziness, leg swelling. Carol Kingsley  Does not follow with cardiology. No prior records in our system. Cardiac risk factors: obesity.       Patient Active Problem List    Diagnosis Date Noted    2nd degree AV block 07/19/2021    Pneumonia due to COVID-19 virus 07/17/2021    Cholestasis of pregnancy 04/03/2013    Hypertension complicating pregnancy 87/05/7633      None  Past Medical History:   Diagnosis Date    Cholestasis of pregnancy 4/3/2013    Diabetes mellitus     HX OTHER MEDICAL     cholestasis or pregnancy      Social History     Socioeconomic History    Marital status: SINGLE     Spouse name: Not on file    Number of children: Not on file    Years of education: Not on file    Highest education level: Not on file   Tobacco Use    Smoking status: Never Smoker   Substance and Sexual Activity    Alcohol use: No    Drug use: No    Sexual activity: Yes     Partners: Male     Birth control/protection: None     Social Determinants of Health     Financial Resource Strain:     Difficulty of Paying Living Expenses:    Food Insecurity:     Worried About Running Out of Food in the Last Year:     Joseph of Food in the Last Year:    Transportation Needs:     Lack of Transportation (Medical):  Lack of Transportation (Non-Medical):    Physical Activity:     Days of Exercise per Week:     Minutes of Exercise per Session:    Stress:     Feeling of Stress :    Social Connections:     Frequency of Communication with Friends and Family:     Frequency of Social Gatherings with Friends and Family:     Attends [employer]ism Services:     Active Member of Clubs or Organizations:     Attends Club or Organization Meetings:     Marital Status:      Allergies   Allergen Reactions    Penicillins Rash    Benadryl [Diphenhydramine Hcl] Unknown (comments)     Stomach aches per patient      No family history on file.    Current Facility-Administered Medications   Medication Dose Route Frequency    tocilizumab (ACTEMRA) 800 mg in 0.9% sodium chloride 100 mL infusion  800 mg IntraVENous ONCE    diphenhydrAMINE (BENADRYL) injection 50 mg  50 mg IntraVENous ONCE PRN    famotidine (PF) (PEPCID) 20 mg in 0.9% sodium chloride 10 mL injection  20 mg IntraVENous ONCE PRN    methylPREDNISolone (PF) (Solu-MEDROL) injection 125 mg  125 mg IntraVENous ONCE PRN    EPINEPHrine HCl (PF) (ADRENALIN) 1 mg/mL (1 mL) injection 0.3 mg  0.3 mg IntraMUSCular Q10MIN PRN    [Held by provider] COVID-19 vac,Ad26-PF ([employer]) injection 1 Dose  1 Dose IntraMUSCular PRIOR TO DISCHARGE    guaiFENesin ER (MUCINEX) tablet 600 mg  600 mg Oral Q12H    ascorbic acid (vitamin C) (VITAMIN C) tablet 500 mg  500 mg Oral BID    zinc sulfate (ZINCATE) 50 mg zinc (220 mg) capsule 1 Capsule  1 Capsule Oral DAILY    ipratropium-albuterol (COMBIVENT RESPIMAT) 20 mcg-100 mcg inhalation spray  1 Puff Inhalation Q6H RT    dexamethasone (DECADRON) 4 mg/mL injection 6 mg  6 mg IntraVENous DAILY    levoFLOXacin (LEVAQUIN) 750 mg in D5W IVPB  750 mg IntraVENous Q24H    sodium chloride (NS) flush 5-40 mL  5-40 mL IntraVENous Q8H    sodium chloride (NS) flush 5-40 mL  5-40 mL IntraVENous PRN    acetaminophen (TYLENOL) tablet 650 mg  650 mg Oral Q6H PRN    Or    acetaminophen (TYLENOL) suppository 650 mg  650 mg Rectal Q6H PRN    polyethylene glycol (MIRALAX) packet 17 g  17 g Oral DAILY PRN    ondansetron (ZOFRAN ODT) tablet 4 mg  4 mg Oral Q8H PRN    Or    ondansetron (ZOFRAN) injection 4 mg  4 mg IntraVENous Q6H PRN    enoxaparin (LOVENOX) injection 40 mg  40 mg SubCUTAneous BID    remdesivir 100 mg in 0.9% sodium chloride 250 mL IVPB  100 mg IntraVENous Q24H        Review of Symptoms:   11 systems reviewed, negative other than as stated in the HPI        Objective:      Visit Vitals  /84   Pulse 90   Temp 98.2 °F (36.8 °C)   Resp 20   Ht 5' 7\" (1.702 m)   Wt 138 kg (304 lb 3.8 oz)   SpO2 91%   BMI 47.65 kg/m²       Physical Assessment:   General Appearance:  pleasant, alert, cooperative, obese AAF sitting in chair in NAD; appears stated age  Eyes:   Mouth/Throat:   Neck:   Pulmonary:  Slightly dyspneic when speaking  Cardiovascular:   Abdomen:   Musculoskeletal:   Extremities:   Skin:   Neuro: grossly normal  Psych: normal mood and affect given the setting    **bedside exam deferred due to covid isolation to reduce exposure and conserve PPE. Patient viewed through window and spoken to on the phone**      Data Review:   Recent Labs     07/18/21  0333 07/17/21  1603   WBC 6.2 6.4   HGB 12.2 13.5   HCT 38.8 42.7    141*     Recent Labs     07/19/21  0725 07/18/21  0333 07/17/21  1603    138 138   K 4.3 3.7 3.5    104 102   CO2 31 28 31   * 112* 115*   BUN 13 7 7   CREA 0.85 0.84 1.04*   CA 9.0 8.3* 8.5   MG  --  1.9  --    PHOS  --  2.1*  --    ALB  --  3.1* 3.6   TBILI  --  0.6 0.4   ALT  --  96* 96*       Recent Labs     07/19/21  0911 07/17/21  1603   TROIQ <0.05 <0.05       No intake or output data in the 24 hours ending 07/19/21 1151     Cardiographics    Telemetry: SR/ST with two significant pauses that had p-waves present suggestive of type of 2nd degree AVB. Pause of 3.51 and 4.17 seconds. ECG: ST       (repeat) pending    Echocardiogram: ordered  CXRAY: \"hypoinspiratory lungs with multifocal bilateral airspace disease\"       Assessment:       Active Problems:    Pneumonia due to COVID-19 virus (7/17/2021)      2nd degree AV block (7/19/2021)         Plan:     Chris Escoto is a 47 y.o. female admitted for Covid 19 PNA. Referred to cardiology care for two significant pauses on telemetry of 3.51 and 4.17 seconds. Troponin negative. D-dimer 0.46. Mg 1.9  · Rhythm disturbance may be related Covid 19 infection  · Two significant pauses with p-wave present likely representative of a type of 2nd degree AVB. Patient is not on rate reduction medications  · Avoid negative chronotropes  · ECHO ordered  · Will ask EP to see - will consult Dr. Alessandro Morales of S for input          Thank you for referring this patient to  [facility]. Will follow.       Jesse Gomez NP  DNP, RN, AGACNP-BC

## 2021-07-20 ENCOUNTER — APPOINTMENT (OUTPATIENT)
Dept: NON INVASIVE DIAGNOSTICS | Age: 41
DRG: 871 | End: 2021-07-20
Attending: STUDENT IN AN ORGANIZED HEALTH CARE EDUCATION/TRAINING PROGRAM
Payer: COMMERCIAL

## 2021-07-20 LAB
ALBUMIN SERPL-MCNC: 3.1 G/DL (ref 3.5–5)
ALBUMIN/GLOB SERPL: 0.7 {RATIO} (ref 1.1–2.2)
ALP SERPL-CCNC: 176 U/L (ref 45–117)
ALT SERPL-CCNC: 103 U/L (ref 12–78)
ANION GAP SERPL CALC-SCNC: 6 MMOL/L (ref 5–15)
AST SERPL-CCNC: 116 U/L (ref 15–37)
BILIRUB SERPL-MCNC: 0.6 MG/DL (ref 0.2–1)
BUN SERPL-MCNC: 15 MG/DL (ref 6–20)
BUN/CREAT SERPL: 19 (ref 12–20)
CALCIUM SERPL-MCNC: 8.8 MG/DL (ref 8.5–10.1)
CHLORIDE SERPL-SCNC: 107 MMOL/L (ref 97–108)
CO2 SERPL-SCNC: 29 MMOL/L (ref 21–32)
CREAT SERPL-MCNC: 0.81 MG/DL (ref 0.55–1.02)
ECHO AO ROOT DIAM: 3.05 CM
ECHO AV AREA PEAK VELOCITY: 3.18 CM2
ECHO AV AREA/BSA PEAK VELOCITY: 1.3 CM2/M2
ECHO AV PEAK GRADIENT: 3.25 MMHG
ECHO AV PEAK VELOCITY: 90.16 CM/S
ECHO LA MAJOR AXIS: 3.57 CM
ECHO LA MINOR AXIS: 1.48 CM
ECHO LV INTERNAL DIMENSION DIASTOLIC: 5.05 CM (ref 3.9–5.3)
ECHO LV INTERNAL DIMENSION SYSTOLIC: 3.97 CM
ECHO LV IVSD: 0.98 CM (ref 0.6–0.9)
ECHO LV MASS 2D: 198.8 G (ref 67–162)
ECHO LV MASS INDEX 2D: 82.2 G/M2 (ref 43–95)
ECHO LV POSTERIOR WALL DIASTOLIC: 1.13 CM (ref 0.6–0.9)
ECHO LVOT DIAM: 2.15 CM
ECHO LVOT PEAK GRADIENT: 2.47 MMHG
ECHO LVOT PEAK VELOCITY: 78.63 CM/S
ECHO PV MAX VELOCITY: 91.83 CM/S
ECHO PV PEAK INSTANTANEOUS GRADIENT SYSTOLIC: 3.37 MMHG
GLOBULIN SER CALC-MCNC: 4.7 G/DL (ref 2–4)
GLUCOSE SERPL-MCNC: 125 MG/DL (ref 65–100)
POTASSIUM SERPL-SCNC: 4 MMOL/L (ref 3.5–5.1)
PROT SERPL-MCNC: 7.8 G/DL (ref 6.4–8.2)
SODIUM SERPL-SCNC: 142 MMOL/L (ref 136–145)

## 2021-07-20 PROCEDURE — 74011250636 HC RX REV CODE- 250/636: Performed by: GENERAL ACUTE CARE HOSPITAL

## 2021-07-20 PROCEDURE — 36415 COLL VENOUS BLD VENIPUNCTURE: CPT

## 2021-07-20 PROCEDURE — 93306 TTE W/DOPPLER COMPLETE: CPT

## 2021-07-20 PROCEDURE — 80053 COMPREHEN METABOLIC PANEL: CPT

## 2021-07-20 PROCEDURE — 65660000000 HC RM CCU STEPDOWN

## 2021-07-20 PROCEDURE — 74011250637 HC RX REV CODE- 250/637: Performed by: STUDENT IN AN ORGANIZED HEALTH CARE EDUCATION/TRAINING PROGRAM

## 2021-07-20 PROCEDURE — 93306 TTE W/DOPPLER COMPLETE: CPT | Performed by: INTERNAL MEDICINE

## 2021-07-20 PROCEDURE — APPNB30 APP NON BILLABLE TIME 0-30 MINS: Performed by: NURSE PRACTITIONER

## 2021-07-20 PROCEDURE — 74011000258 HC RX REV CODE- 258: Performed by: GENERAL ACUTE CARE HOSPITAL

## 2021-07-20 PROCEDURE — 2709999900 HC NON-CHARGEABLE SUPPLY

## 2021-07-20 PROCEDURE — 94640 AIRWAY INHALATION TREATMENT: CPT

## 2021-07-20 PROCEDURE — 77010033711 HC HIGH FLOW OXYGEN

## 2021-07-20 PROCEDURE — 74011000250 HC RX REV CODE- 250: Performed by: GENERAL ACUTE CARE HOSPITAL

## 2021-07-20 PROCEDURE — 74011250637 HC RX REV CODE- 250/637: Performed by: GENERAL ACUTE CARE HOSPITAL

## 2021-07-20 RX ORDER — LORAZEPAM 2 MG/ML
1 INJECTION INTRAMUSCULAR ONCE
Status: COMPLETED | OUTPATIENT
Start: 2021-07-20 | End: 2021-07-20

## 2021-07-20 RX ORDER — LEVOFLOXACIN 750 MG/1
750 TABLET ORAL EVERY 24 HOURS
Status: COMPLETED | OUTPATIENT
Start: 2021-07-20 | End: 2021-07-21

## 2021-07-20 RX ADMIN — IPRATROPIUM BROMIDE AND ALBUTEROL 1 PUFF: 20; 100 SPRAY, METERED RESPIRATORY (INHALATION) at 14:03

## 2021-07-20 RX ADMIN — REMDESIVIR 100 MG: 100 INJECTION, POWDER, LYOPHILIZED, FOR SOLUTION INTRAVENOUS at 21:23

## 2021-07-20 RX ADMIN — ENOXAPARIN SODIUM 40 MG: 40 INJECTION SUBCUTANEOUS at 20:06

## 2021-07-20 RX ADMIN — ENOXAPARIN SODIUM 40 MG: 40 INJECTION SUBCUTANEOUS at 08:51

## 2021-07-20 RX ADMIN — IPRATROPIUM BROMIDE AND ALBUTEROL 1 PUFF: 20; 100 SPRAY, METERED RESPIRATORY (INHALATION) at 19:43

## 2021-07-20 RX ADMIN — LEVOFLOXACIN 750 MG: 750 TABLET, FILM COATED ORAL at 17:42

## 2021-07-20 RX ADMIN — DEXAMETHASONE SODIUM PHOSPHATE 6 MG: 4 INJECTION, SOLUTION INTRAMUSCULAR; INTRAVENOUS at 08:51

## 2021-07-20 RX ADMIN — GUAIFENESIN 600 MG: 600 TABLET, EXTENDED RELEASE ORAL at 20:07

## 2021-07-20 RX ADMIN — Medication 10 ML: at 08:52

## 2021-07-20 RX ADMIN — LORAZEPAM 1 MG: 2 INJECTION INTRAMUSCULAR; INTRAVENOUS at 14:12

## 2021-07-20 RX ADMIN — IPRATROPIUM BROMIDE AND ALBUTEROL 1 PUFF: 20; 100 SPRAY, METERED RESPIRATORY (INHALATION) at 08:12

## 2021-07-20 RX ADMIN — OXYCODONE HYDROCHLORIDE AND ACETAMINOPHEN 500 MG: 500 TABLET ORAL at 08:51

## 2021-07-20 RX ADMIN — GUAIFENESIN 600 MG: 600 TABLET, EXTENDED RELEASE ORAL at 08:51

## 2021-07-20 RX ADMIN — OXYCODONE HYDROCHLORIDE AND ACETAMINOPHEN 500 MG: 500 TABLET ORAL at 17:42

## 2021-07-20 RX ADMIN — ZINC SULFATE 220 MG (50 MG) CAPSULE 1 CAPSULE: CAPSULE at 08:51

## 2021-07-20 RX ADMIN — Medication 10 ML: at 21:23

## 2021-07-20 RX ADMIN — Medication 1 AMPULE: at 08:52

## 2021-07-20 RX ADMIN — Medication 10 ML: at 14:12

## 2021-07-20 RX ADMIN — IPRATROPIUM BROMIDE AND ALBUTEROL 1 PUFF: 20; 100 SPRAY, METERED RESPIRATORY (INHALATION) at 02:47

## 2021-07-20 NOTE — PROGRESS NOTES
2120: SBAR report received from transferring nurse Osvaldo Lacy  6676: Pt. Arrived to room 2524 and transferred to new bed without incident. Assessment completed per charting. RT at bedside to place pt. On high flow NC. The patient denies pain and no acute distress noted  0000: Assessment completed per charting, pt. Denies any pain and turned on her side. 0200: Pt. Continues to resting comfortably in the bed with equal respirations and no distress noted. 0400:  Assessment completed per charting, pt. Denies any pain and turned on her side. No acute distress noted  0705: SBAR report given to on coming nurse Kavitha Forbes RN.  All questions answered and release care of patient

## 2021-07-20 NOTE — PROGRESS NOTES
RAPID RESPONSE TEAM     Received call from primary RN [facility] regarding patient's hypoxia and increases WOB. In another rapid response and unable to respond; nursing supervisor notified. NP and RT at bedside. Plan to transfer patient to CCU as PCU hold for [facility] and negative pressure room; [place] +.      Neo Jauregui RN  Ext. [phone]

## 2021-07-20 NOTE — PROGRESS NOTES
0700: Shift report received from Deja Sanchez RN.    0800: Shift assessment completed; see flowsheet for details. 0900: Echo tech at bedside. 1200: Reassessment, no changes. Patient assisted to bedside commode, very anxious with transfer, tachypneic, tachycardic. Dr. Griselda Carver paged re: orders for anxiety. 1205: Order for Ativvan 1mg once received. 1412: Ativan administered per order. 1600: Reassessment, no changes.      1900: Shift report given to Vidal Rankin RN

## 2021-07-20 NOTE — PROGRESS NOTES
Transition of Care Plan:  RUR: 9%  Disposition: Plan Home with Family   RN continue to wean oxygen, document oxygen challenges and notify CM if unable to wean 48 hours prior to DC so we can start ordering home oxygen if needed. CCU CM completed chart review and participated in rounds. Follow up appointments: PCP unable to recall name of PCP. PCP in [place]. ??  DME needed: TBD pending O2 needs at d/c  Transportation at Discharge:    [employer] or means to access home:  to provide      IM Medicare letter: n/a commercial insurance   Caregiver Contact: Mother Meredith Nina [phone]  Discharge Caregiver contacted prior to discharge? To be contacted prior to d/c           CM will continue to monitor discharge plan.      Florence Willis, [address]  Ext [phone]

## 2021-07-20 NOTE — PROGRESS NOTES
EP note reviewed. No further significant pauses. Rhythm ST with brief jennifer overnight. Worsening respiratory status noted. ECHO pending. Will sign off unless significant concerns on ECHO or if asked to see patient again for cardiology management. Please call for questions or concerns.           Kameron Calderon NP  DNP, RN, AGACNP-BC

## 2021-07-20 NOTE — PROGRESS NOTES
Received notification from bedside RN about patient with regards to: desaturation in low 80's, needs to be on negative pressure room for high flow support  VS: /93, , RR 40, O2 sat 87% on NRM    Intervention given: Transfer to stepdown, heated high flow RT to titrate setting ordered

## 2021-07-21 LAB
ALBUMIN SERPL-MCNC: 3 G/DL (ref 3.5–5)
ALBUMIN/GLOB SERPL: 0.7 {RATIO} (ref 1.1–2.2)
ALP SERPL-CCNC: 156 U/L (ref 45–117)
ALT SERPL-CCNC: 134 U/L (ref 12–78)
ANION GAP SERPL CALC-SCNC: 7 MMOL/L (ref 5–15)
AST SERPL-CCNC: 106 U/L (ref 15–37)
BASOPHILS # BLD: 0 K/UL (ref 0–0.1)
BASOPHILS NFR BLD: 0 % (ref 0–1)
BILIRUB SERPL-MCNC: 0.4 MG/DL (ref 0.2–1)
BUN SERPL-MCNC: 16 MG/DL (ref 6–20)
BUN/CREAT SERPL: 18 (ref 12–20)
CALCIUM SERPL-MCNC: 8.7 MG/DL (ref 8.5–10.1)
CHLORIDE SERPL-SCNC: 106 MMOL/L (ref 97–108)
CO2 SERPL-SCNC: 31 MMOL/L (ref 21–32)
CREAT SERPL-MCNC: 0.87 MG/DL (ref 0.55–1.02)
DIFFERENTIAL METHOD BLD: ABNORMAL
EOSINOPHIL # BLD: 0.1 K/UL (ref 0–0.4)
EOSINOPHIL NFR BLD: 1 % (ref 0–7)
ERYTHROCYTE [DISTWIDTH] IN BLOOD BY AUTOMATED COUNT: 15.9 % (ref 11.5–14.5)
GLOBULIN SER CALC-MCNC: 4.4 G/DL (ref 2–4)
GLUCOSE SERPL-MCNC: 129 MG/DL (ref 65–100)
HCT VFR BLD AUTO: 41.6 % (ref 35–47)
HGB BLD-MCNC: 13.3 G/DL (ref 11.5–16)
IMM GRANULOCYTES # BLD AUTO: 0 K/UL (ref 0–0.04)
IMM GRANULOCYTES NFR BLD AUTO: 0 % (ref 0–0.5)
LYMPHOCYTES # BLD: 1.3 K/UL (ref 0.8–3.5)
LYMPHOCYTES NFR BLD: 21 % (ref 12–49)
MAGNESIUM SERPL-MCNC: 2.8 MG/DL (ref 1.6–2.4)
MCH RBC QN AUTO: 26.7 PG (ref 26–34)
MCHC RBC AUTO-ENTMCNC: 32 G/DL (ref 30–36.5)
MCV RBC AUTO: 83.5 FL (ref 80–99)
MONOCYTES # BLD: 0.6 K/UL (ref 0–1)
MONOCYTES NFR BLD: 10 % (ref 5–13)
NEUTS BAND NFR BLD MANUAL: 1 %
NEUTS SEG # BLD: 4.3 K/UL (ref 1.8–8)
NEUTS SEG NFR BLD: 67 % (ref 32–75)
NRBC # BLD: 0 K/UL (ref 0–0.01)
NRBC BLD-RTO: 0 PER 100 WBC
PHOSPHATE SERPL-MCNC: 3.5 MG/DL (ref 2.6–4.7)
PLATELET # BLD AUTO: 284 K/UL (ref 150–400)
PMV BLD AUTO: 9.7 FL (ref 8.9–12.9)
POTASSIUM SERPL-SCNC: 3.9 MMOL/L (ref 3.5–5.1)
PROT SERPL-MCNC: 7.4 G/DL (ref 6.4–8.2)
RBC # BLD AUTO: 4.98 M/UL (ref 3.8–5.2)
RBC MORPH BLD: ABNORMAL
SODIUM SERPL-SCNC: 144 MMOL/L (ref 136–145)
WBC # BLD AUTO: 6.3 K/UL (ref 3.6–11)

## 2021-07-21 PROCEDURE — 65610000006 HC RM INTENSIVE CARE

## 2021-07-21 PROCEDURE — 74011250637 HC RX REV CODE- 250/637: Performed by: GENERAL ACUTE CARE HOSPITAL

## 2021-07-21 PROCEDURE — 74011250637 HC RX REV CODE- 250/637: Performed by: INTERNAL MEDICINE

## 2021-07-21 PROCEDURE — 65660000000 HC RM CCU STEPDOWN

## 2021-07-21 PROCEDURE — 74011250637 HC RX REV CODE- 250/637: Performed by: NURSE PRACTITIONER

## 2021-07-21 PROCEDURE — 83735 ASSAY OF MAGNESIUM: CPT

## 2021-07-21 PROCEDURE — 94640 AIRWAY INHALATION TREATMENT: CPT

## 2021-07-21 PROCEDURE — 36415 COLL VENOUS BLD VENIPUNCTURE: CPT

## 2021-07-21 PROCEDURE — 77010033711 HC HIGH FLOW OXYGEN

## 2021-07-21 PROCEDURE — 74011000258 HC RX REV CODE- 258: Performed by: GENERAL ACUTE CARE HOSPITAL

## 2021-07-21 PROCEDURE — 74011000250 HC RX REV CODE- 250: Performed by: GENERAL ACUTE CARE HOSPITAL

## 2021-07-21 PROCEDURE — 74011250637 HC RX REV CODE- 250/637: Performed by: STUDENT IN AN ORGANIZED HEALTH CARE EDUCATION/TRAINING PROGRAM

## 2021-07-21 PROCEDURE — 85025 COMPLETE CBC W/AUTO DIFF WBC: CPT

## 2021-07-21 PROCEDURE — 80053 COMPREHEN METABOLIC PANEL: CPT

## 2021-07-21 PROCEDURE — 74011250636 HC RX REV CODE- 250/636: Performed by: GENERAL ACUTE CARE HOSPITAL

## 2021-07-21 PROCEDURE — 84100 ASSAY OF PHOSPHORUS: CPT

## 2021-07-21 RX ORDER — LORAZEPAM 2 MG/ML
1 INJECTION INTRAMUSCULAR
Status: DISCONTINUED | OUTPATIENT
Start: 2021-07-21 | End: 2021-07-26 | Stop reason: HOSPADM

## 2021-07-21 RX ORDER — DEXMEDETOMIDINE HYDROCHLORIDE 4 UG/ML
.1-1.5 INJECTION, SOLUTION INTRAVENOUS
Status: DISCONTINUED | OUTPATIENT
Start: 2021-07-21 | End: 2021-07-22

## 2021-07-21 RX ORDER — MUPIROCIN 20 MG/G
OINTMENT TOPICAL EVERY 12 HOURS
Status: DISCONTINUED | OUTPATIENT
Start: 2021-07-21 | End: 2021-07-25 | Stop reason: ALTCHOICE

## 2021-07-21 RX ADMIN — LEVOFLOXACIN 750 MG: 750 TABLET, FILM COATED ORAL at 17:46

## 2021-07-21 RX ADMIN — ENOXAPARIN SODIUM 40 MG: 40 INJECTION SUBCUTANEOUS at 08:30

## 2021-07-21 RX ADMIN — MUPIROCIN: 20 OINTMENT TOPICAL at 20:10

## 2021-07-21 RX ADMIN — IPRATROPIUM BROMIDE AND ALBUTEROL 1 PUFF: 20; 100 SPRAY, METERED RESPIRATORY (INHALATION) at 02:49

## 2021-07-21 RX ADMIN — DEXAMETHASONE SODIUM PHOSPHATE 6 MG: 4 INJECTION, SOLUTION INTRAMUSCULAR; INTRAVENOUS at 08:31

## 2021-07-21 RX ADMIN — OXYCODONE HYDROCHLORIDE AND ACETAMINOPHEN 500 MG: 500 TABLET ORAL at 17:46

## 2021-07-21 RX ADMIN — IPRATROPIUM BROMIDE AND ALBUTEROL 1 PUFF: 20; 100 SPRAY, METERED RESPIRATORY (INHALATION) at 14:50

## 2021-07-21 RX ADMIN — IPRATROPIUM BROMIDE AND ALBUTEROL 1 PUFF: 20; 100 SPRAY, METERED RESPIRATORY (INHALATION) at 08:29

## 2021-07-21 RX ADMIN — Medication 1 LOZENGE: at 02:28

## 2021-07-21 RX ADMIN — GUAIFENESIN 600 MG: 600 TABLET, EXTENDED RELEASE ORAL at 20:10

## 2021-07-21 RX ADMIN — ENOXAPARIN SODIUM 40 MG: 40 INJECTION SUBCUTANEOUS at 20:09

## 2021-07-21 RX ADMIN — GUAIFENESIN 600 MG: 600 TABLET, EXTENDED RELEASE ORAL at 08:31

## 2021-07-21 RX ADMIN — Medication 10 ML: at 05:33

## 2021-07-21 RX ADMIN — OXYCODONE HYDROCHLORIDE AND ACETAMINOPHEN 500 MG: 500 TABLET ORAL at 08:31

## 2021-07-21 RX ADMIN — Medication 10 ML: at 15:49

## 2021-07-21 RX ADMIN — IPRATROPIUM BROMIDE AND ALBUTEROL 1 PUFF: 20; 100 SPRAY, METERED RESPIRATORY (INHALATION) at 20:04

## 2021-07-21 RX ADMIN — REMDESIVIR 100 MG: 100 INJECTION, POWDER, LYOPHILIZED, FOR SOLUTION INTRAVENOUS at 21:08

## 2021-07-21 RX ADMIN — ZINC SULFATE 220 MG (50 MG) CAPSULE 1 CAPSULE: CAPSULE at 08:31

## 2021-07-21 NOTE — PROGRESS NOTES
6553: Patient with very little respiratory reserve for activity, HR and RR increases rapidly with use of bedpan. Patient requesting \"cough drop\". Obtained order for 1 x dose.

## 2021-07-21 NOTE — CONSULTS
[facility]                                                                                          Critical Care Consult Note  Clinton Kemp MD  Answering service: [phone] OR [phone] from in house phone        Date of Service:  2021  NAME:  Faith Seo  :  1980  MRN:  [id number]    Referring MD: April Francis MD    Reason For Consultation:  Severe COVID Delirium    Chief Complaint and History of Present illness:    47 y.o. female with no significant PMH who was admitted for Pneumonia due to COVID-19 virus [U07.1, J12.82].    Per Chart records, she presented to the ED with c/o worsening SOB after being recently diagnosed with Covid 19. Referred to cardiology for sinus pauses on telemetry. Tele review shows two pauses of 3.51 seconds and 4.17 seconds. On assessment, Carol Kingsley endorses SOB, that is slightly improved since admission. No c/o pain, palpitations, dizziness, leg swelling. She has become increasingly agitated  Overnight and this am she is tachycardic, still on HHFNC needing NRB mask and   She is now needing precedex drip. I was called by RN to evaluate as she is becoming  Critical to see her on consultation.  Internal medicine will be notified when they round unclear  Who the rounding hospitalist is right now.              Patient Active Problem List     Diagnosis Date Noted    2nd degree AV block 2021    Pneumonia due to COVID-19 virus 2021    Cholestasis of pregnancy 2013    Hypertension complicating pregnancy       None       Past Medical History:   Diagnosis Date    Cholestasis of pregnancy 4/3/2013    Diabetes mellitus      HX OTHER MEDICAL       cholestasis or pregnancy      Social History               Socioeconomic History    Marital status: SINGLE       Spouse name: Not on file    Number of children: Not on file    Years of education: Not on file    Highest education level: Not on file   Tobacco Use    Smoking status: Never Smoker   Substance and Sexual Activity    Alcohol use: No    Drug use: No    Sexual activity: Yes       Partners: Male       Birth control/protection: None      Social Determinants of Health          Financial Resource Strain:     Difficulty of Paying Living Expenses:    Food Insecurity:     Worried About Running Out of Food in the Last Year:     [address] in the Last Year:    Transportation Needs:     Lack of Transportation (Medical):  Lack of Transportation (Non-Medical):    Physical Activity:     Days of Exercise per Week:     Minutes of Exercise per Session:    Stress:     Feeling of Stress :    Social Connections:     Frequency of Communication with Friends and Family:     Frequency of Social Gatherings with Friends and Family:     Attends Scientologist Services:     Active Member of Clubs or Organizations:     Attends Club or Organization Meetings:     Marital Status:                Allergies   Allergen Reactions    Penicillins Rash    Benadryl [Diphenhydramine Hcl] Unknown (comments)       Stomach aches per patient      No family history on file.           Current Facility-Administered Medications   Medication Dose Route Frequency    tocilizumab (ACTEMRA) 800 mg in 0.9% sodium chloride 100 mL infusion  800 mg IntraVENous ONCE    diphenhydrAMINE (BENADRYL) injection 50 mg  50 mg IntraVENous ONCE PRN    famotidine (PF) (PEPCID) 20 mg in 0.9% sodium chloride 10 mL injection  20 mg IntraVENous ONCE PRN    methylPREDNISolone (PF) (Solu-MEDROL) injection 125 mg  125 mg IntraVENous ONCE PRN    EPINEPHrine HCl (PF) (ADRENALIN) 1 mg/mL (1 mL) injection 0.3 mg  0.3 mg IntraMUSCular Q10MIN PRN    [Held by provider] COVID-19 vac,Ad26-PF (GABRIELA) injection 1 Dose  1 Dose IntraMUSCular PRIOR TO DISCHARGE    guaiFENesin ER (MUCINEX) tablet 600 mg  600 mg Oral Q12H    ascorbic acid (vitamin C) (VITAMIN C) tablet 500 mg  500 mg Oral BID    zinc sulfate (ZINCATE) 50 mg zinc (220 mg) capsule 1 Capsule  1 Capsule Oral DAILY    ipratropium-albuterol (COMBIVENT RESPIMAT) 20 mcg-100 mcg inhalation spray  1 Puff Inhalation Q6H RT    dexamethasone (DECADRON) 4 mg/mL injection 6 mg  6 mg IntraVENous DAILY    levoFLOXacin (LEVAQUIN) 750 mg in D5W IVPB  750 mg IntraVENous Q24H    sodium chloride (NS) flush 5-40 mL  5-40 mL IntraVENous Q8H    sodium chloride (NS) flush 5-40 mL  5-40 mL IntraVENous PRN    acetaminophen (TYLENOL) tablet 650 mg  650 mg Oral Q6H PRN     Or    acetaminophen (TYLENOL) suppository 650 mg  650 mg Rectal Q6H PRN    polyethylene glycol (MIRALAX) packet 17 g  17 g Oral DAILY PRN    ondansetron (ZOFRAN ODT) tablet 4 mg  4 mg Oral Q8H PRN     Or    ondansetron (ZOFRAN) injection 4 mg  4 mg IntraVENous Q6H PRN    enoxaparin (LOVENOX) injection 40 mg  40 mg SubCUTAneous BID    remdesivir 100 mg in 0.9% sodium chloride 250 mL IVPB  100 mg IntraVENous Q24H           Assessment & Plan:     Acute hypoxic respiratory failure secondary to COVID-19 PNA  Sepsis  Patient oxygen requirements increased to [facility] plus on and off NRB, wean as tolerated. .  On remdesivir, dexamethasone, Levaquin, got also ACTEMRA. IV steroids, bronchodilators  Lovenox 40 mg twice daily  Procalcitonin 0.18 so we will continue the antibiotics  Empiric abx  Combivent 4 times daily  Mucinex twice daily  Vitamin C and zinc added. Incentive spirometer     Sinus pause x2  Second-degree AV block  Avoid beta-blocker. Echo ordered pending  Cardiology on boardlikely going to consult Dr. Rona Rebolledo for the input nothing  Further from their standpoint.       40 or above Morbid obesity / Body mass index is 47.65 kg/m². Acute Delirium vs Severe Anxiety    Ativan PRN  Precedex if above fails     Code status: Full  Prophylaxis: Lovenox  Recommended Disposition: Home w/Family    I personally spent  40    minutes of critical care time.   This is time spent at this critically ill patient's bedside actively involved in patient care as well as the coordination of care and discussions with the patient's family. This does not include any procedural time which has been billed separately. Hospital Problems  Date Reviewed: 8/15/2016        Codes Class Noted POA    2nd degree AV block ICD-10-CM: I44.1  ICD-9-CM: 426.13  7/19/2021 Unknown        Pneumonia due to COVID-19 virus ICD-10-CM: U07.1, J12.82  ICD-9-CM: 480.8, 079.89  7/17/2021 Unknown                Review of Systems:   A comprehensive review of systems was negative except for that written in the HPI. Vital Signs:    Last 24hrs VS reviewed since prior progress note. Most recent are:  Visit Vitals  /84   Pulse 85   Temp 97.3 °F (36.3 °C)   Resp 28   Ht 5' 7\" (1.702 m)   Wt 138 kg (304 lb 3.8 oz)   SpO2 97%   BMI 47.65 kg/m²         Intake/Output Summary (Last 24 hours) at 7/21/2021 7134  Last data filed at 7/20/2021 2123  Gross per 24 hour   Intake 310 ml   Output    Net 310 ml        Physical Examination:             Constitutional:  No acute distress, cooperative, pleasant    ENT:  Oral mucous moist, oropharynx benign. Resp:  Crackles bilaterally. No wheezing/rhonchi/rales. No accessory muscle use   CV:  Regular rhythm, normal rate, no murmurs, gallops, rubs    GI:  Soft, non distended, non tender. normoactive bowel sounds, no hepatosplenomegaly     Musculoskeletal:  No edema, warm, 2+ pulses throughout    Neurologic:  Moves all extremities. AAOx3, CN II-XII reviewed     Psych:  Good insight, Not anxious nor agitated. Skin:  Good turgor, no rashes or ulcers  Hematologic/Lymphatic/Immunlogic:  No jaundice nor lymph node swelling  :  deferred  Eyes:  EOMI. Anicteric sclerae, PERRL.        Data Review:    Review and/or order of clinical lab test      Labs:     Recent Labs     07/21/21  0529   WBC 6.3   HGB 13.3   HCT 41.6        Recent Labs     07/21/21  0529 07/20/21  0455 07/19/21  0725    142 138   K 3.9 4.0 4.3    107 103   CO2 31 29 31   BUN 16 15 13   CREA 0.87 0.81 0.85   * 125* 106*   CA 8.7 8.8 9.0   MG 2.8*  --   --    PHOS 3.5  --   --      Recent Labs     07/21/21  0529 07/20/21  0455   * 103*   * 176*   TBILI 0.4 0.6   TP 7.4 7.8   ALB 3.0* 3.1*   GLOB 4.4* 4.7*     No results for input(s): INR, PTP, APTT, INREXT in the last 72 hours. No results for input(s): FE, TIBC, PSAT, FERR in the last 72 hours. No results found for: FOL, RBCF   No results for input(s): PH, PCO2, PO2 in the last 72 hours.   Recent Labs     07/19/21  0911   TROIQ <0.05     No results found for: CHOL, CHOLX, CHLST, CHOLV, HDL, HDLP, LDL, LDLC, DLDLP, TGLX, TRIGL, TRIGP, CHHD, CHHDX  No results found for: [facility]  Lab Results   Component Value Date/Time    Color YELLOW/STRAW 07/18/2021 02:03 AM    Appearance CLOUDY (A) 07/18/2021 02:03 AM    Specific gravity 1.022 07/18/2021 02:03 AM    Specific gravity >1.030 (H) 08/25/2013 10:10 PM    pH (UA) 5.5 07/18/2021 02:03 AM    Protein 30 (A) 07/18/2021 02:03 AM    Glucose Negative 07/18/2021 02:03 AM    Ketone 15 (A) 07/18/2021 02:03 AM    Bilirubin Negative 07/18/2021 02:03 AM    Urobilinogen 1.0 07/18/2021 02:03 AM    Nitrites Negative 07/18/2021 02:03 AM    Leukocyte Esterase MODERATE (A) 07/18/2021 02:03 AM    Epithelial cells FEW 07/18/2021 02:03 AM    Bacteria Negative 07/18/2021 02:03 AM    WBC 10-20 07/18/2021 02:03 AM    RBC 0-5 07/18/2021 02:03 AM         Medications Reviewed:     Current Facility-Administered Medications   Medication Dose Route Frequency    dexmedeTOMidine in 0.9 % NaCl (PRECEDEX) 400 mcg/100 mL (4 mcg/mL) infusion soln  0.1-1.5 mcg/kg/hr IntraVENous TITRATE    levoFLOXacin (LEVAQUIN) tablet 750 mg  750 mg Oral Q24H    alcohol 62% (NOZIN) nasal  1 Ampule  1 Ampule Topical Q12H    guaiFENesin ER (MUCINEX) tablet 600 mg  600 mg Oral Q12H    ascorbic acid (vitamin C) (VITAMIN C) tablet 500 mg  500 mg Oral BID    zinc sulfate (ZINCATE) 50 mg zinc (220 mg) capsule 1 Capsule  1 Capsule Oral DAILY    ipratropium-albuterol (COMBIVENT RESPIMAT) 20 mcg-100 mcg inhalation spray  1 Puff Inhalation Q6H RT    dexamethasone (DECADRON) 4 mg/mL injection 6 mg  6 mg IntraVENous DAILY    sodium chloride (NS) flush 5-40 mL  5-40 mL IntraVENous Q8H    sodium chloride (NS) flush 5-40 mL  5-40 mL IntraVENous PRN    acetaminophen (TYLENOL) tablet 650 mg  650 mg Oral Q6H PRN    Or    acetaminophen (TYLENOL) suppository 650 mg  650 mg Rectal Q6H PRN    polyethylene glycol (MIRALAX) packet 17 g  17 g Oral DAILY PRN    ondansetron (ZOFRAN ODT) tablet 4 mg  4 mg Oral Q8H PRN    Or    ondansetron (ZOFRAN) injection 4 mg  4 mg IntraVENous Q6H PRN    enoxaparin (LOVENOX) injection 40 mg  40 mg SubCUTAneous BID    remdesivir 100 mg in 0.9% sodium chloride 250 mL IVPB  100 mg IntraVENous Q24H     ______________________________________________________________________  EXPECTED LENGTH OF STAY: 4d 19h  ACTUAL LENGTH OF STAY:          4                 Papi Mathur MD

## 2021-07-21 NOTE — PROGRESS NOTES
0700: Shift updates received from Sallie Manuel RN.    0800: Shift assessment completed; see flowsheet for details. 0945: Participated in [facility] with Dr. Alexandra Dye. 1200: Shift reassessment, no changes at this time. 1330: Updates provided to patient's , Garrick Perez. 1600: Shift reassessment, no changes at this time. 1830: Patient assisted to bedside commode; HR up to 150s, but otherwise tolerated well.    1900: Shift report given to Valdo Fleming RN.

## 2021-07-21 NOTE — INTERDISCIPLINARY ROUNDS
Critical care interdisciplinary rounds held on 07/21/2021. Following members present, Pharmacy, Case Management, Respiratory Therapy, Physical Therapy, Palliative and Nutrition. Led by Kiley Valladares RN and Dr. Abner Looney. Plan of care discussed. See clinical pathway for plan of care and interventions and desired outcomes.

## 2021-07-21 NOTE — PROGRESS NOTES
1900.  Received report from  [facility]. Included were Kardex, MAR, orders, labs, ITRACE, LDA's, events from today and plans for tonight. Dual skin assessment performed at bedside with offgoing nurse.    2030. Assessment performed. See flowsheet for details. Pt expressed no needs or complaints at this time. Respirations unlabored at rest on current O2 therapy. Goals set for this shift: comfort, safety, and rest.      0030. Reassessment, no changes. Pt does report some anxiety \"I feel jittery\". Assured her that her sats and breathing look fine, but I have orders for ativan for anxiety. 1mg prn dose given per order. Pt also provided with popsicle and ice water with flavor packets because she states water doesn't taste good to her right now. Pt resting comfortably at this time. Will continue to monitor. 0500. Pt reassessed. Pt appears to be resting comfortably for the past few hours. Awakens, follows commands, drowsy. Labs drawn from PIV.    7902. Labs redrawn due to delta alerts.

## 2021-07-22 LAB
ALBUMIN SERPL-MCNC: 3.2 G/DL (ref 3.5–5)
ALBUMIN/GLOB SERPL: 0.7 {RATIO} (ref 1.1–2.2)
ALP SERPL-CCNC: 155 U/L (ref 45–117)
ALT SERPL-CCNC: 138 U/L (ref 12–78)
ANION GAP SERPL CALC-SCNC: 7 MMOL/L (ref 5–15)
AST SERPL-CCNC: 92 U/L (ref 15–37)
BACTERIA SPEC CULT: NORMAL
BILIRUB SERPL-MCNC: 0.5 MG/DL (ref 0.2–1)
BUN SERPL-MCNC: 16 MG/DL (ref 6–20)
BUN/CREAT SERPL: 20 (ref 12–20)
CALCIUM SERPL-MCNC: 8.8 MG/DL (ref 8.5–10.1)
CHLORIDE SERPL-SCNC: 107 MMOL/L (ref 97–108)
CO2 SERPL-SCNC: 28 MMOL/L (ref 21–32)
CREAT SERPL-MCNC: 0.8 MG/DL (ref 0.55–1.02)
GLOBULIN SER CALC-MCNC: 4.5 G/DL (ref 2–4)
GLUCOSE SERPL-MCNC: 98 MG/DL (ref 65–100)
POTASSIUM SERPL-SCNC: 3.8 MMOL/L (ref 3.5–5.1)
PROT SERPL-MCNC: 7.7 G/DL (ref 6.4–8.2)
SERVICE CMNT-IMP: NORMAL
SODIUM SERPL-SCNC: 142 MMOL/L (ref 136–145)

## 2021-07-22 PROCEDURE — 74011250636 HC RX REV CODE- 250/636: Performed by: GENERAL ACUTE CARE HOSPITAL

## 2021-07-22 PROCEDURE — 65660000000 HC RM CCU STEPDOWN

## 2021-07-22 PROCEDURE — 77010033711 HC HIGH FLOW OXYGEN

## 2021-07-22 PROCEDURE — 74011250637 HC RX REV CODE- 250/637: Performed by: GENERAL ACUTE CARE HOSPITAL

## 2021-07-22 PROCEDURE — 80053 COMPREHEN METABOLIC PANEL: CPT

## 2021-07-22 PROCEDURE — 74011250637 HC RX REV CODE- 250/637: Performed by: STUDENT IN AN ORGANIZED HEALTH CARE EDUCATION/TRAINING PROGRAM

## 2021-07-22 PROCEDURE — 65610000006 HC RM INTENSIVE CARE

## 2021-07-22 PROCEDURE — 94640 AIRWAY INHALATION TREATMENT: CPT

## 2021-07-22 PROCEDURE — 36415 COLL VENOUS BLD VENIPUNCTURE: CPT

## 2021-07-22 PROCEDURE — 74011250636 HC RX REV CODE- 250/636: Performed by: INTERNAL MEDICINE

## 2021-07-22 RX ADMIN — IPRATROPIUM BROMIDE AND ALBUTEROL 1 PUFF: 20; 100 SPRAY, METERED RESPIRATORY (INHALATION) at 13:16

## 2021-07-22 RX ADMIN — IPRATROPIUM BROMIDE AND ALBUTEROL 1 PUFF: 20; 100 SPRAY, METERED RESPIRATORY (INHALATION) at 08:12

## 2021-07-22 RX ADMIN — ENOXAPARIN SODIUM 40 MG: 40 INJECTION SUBCUTANEOUS at 08:54

## 2021-07-22 RX ADMIN — IPRATROPIUM BROMIDE AND ALBUTEROL 1 PUFF: 20; 100 SPRAY, METERED RESPIRATORY (INHALATION) at 02:01

## 2021-07-22 RX ADMIN — Medication 10 ML: at 00:45

## 2021-07-22 RX ADMIN — OXYCODONE HYDROCHLORIDE AND ACETAMINOPHEN 500 MG: 500 TABLET ORAL at 18:36

## 2021-07-22 RX ADMIN — OXYCODONE HYDROCHLORIDE AND ACETAMINOPHEN 500 MG: 500 TABLET ORAL at 08:54

## 2021-07-22 RX ADMIN — DEXAMETHASONE SODIUM PHOSPHATE 6 MG: 4 INJECTION, SOLUTION INTRAMUSCULAR; INTRAVENOUS at 08:54

## 2021-07-22 RX ADMIN — IPRATROPIUM BROMIDE AND ALBUTEROL 1 PUFF: 20; 100 SPRAY, METERED RESPIRATORY (INHALATION) at 20:43

## 2021-07-22 RX ADMIN — Medication 10 ML: at 15:30

## 2021-07-22 RX ADMIN — Medication 10 ML: at 23:47

## 2021-07-22 RX ADMIN — GUAIFENESIN 600 MG: 600 TABLET, EXTENDED RELEASE ORAL at 08:55

## 2021-07-22 RX ADMIN — MUPIROCIN: 20 OINTMENT TOPICAL at 20:51

## 2021-07-22 RX ADMIN — MUPIROCIN: 20 OINTMENT TOPICAL at 08:56

## 2021-07-22 RX ADMIN — GUAIFENESIN 600 MG: 600 TABLET, EXTENDED RELEASE ORAL at 20:51

## 2021-07-22 RX ADMIN — ENOXAPARIN SODIUM 40 MG: 40 INJECTION SUBCUTANEOUS at 20:51

## 2021-07-22 RX ADMIN — Medication 10 ML: at 08:54

## 2021-07-22 RX ADMIN — LORAZEPAM 1 MG: 2 INJECTION INTRAMUSCULAR; INTRAVENOUS at 00:45

## 2021-07-22 RX ADMIN — ZINC SULFATE 220 MG (50 MG) CAPSULE 1 CAPSULE: CAPSULE at 08:54

## 2021-07-22 NOTE — PROGRESS NOTES
[address] [facility] Adult  Hospitalist Group                                                                                          Critical Care Progress Note  Madeline Rendon MD  Answering service: [phone] from in house phone        Date of Service:  2021  NAME:  Tucker Haynes  :  1980  MRN:  [id number]      Interval history / Subjective: On 40 liters and 50% fio2. Got ativan  Yesterday for anxiety, never needed precedex actually. In bed, will get her OOB today. Seems calm this am.      Assessment & Plan:     Acute hypoxic respiratory failure secondary to COVID-19 PNA  Sepsis  Patient oxygen requirements Decreased to [facility] as above, wean as tolerated. .  On remdesivir, dexamethasone, Levaquin, got also ACTEMRA. IV steroids, bronchodilators  Lovenox 40 mg twice daily  Procalcitonin 0.18 so we will continue the antibiotics  Combivent 4 times daily  Mucinex twice daily  Vitamin C and zinc added. Incentive spirometer     Sinus pause x2  Second-degree AV block  Avoid beta-blocker. Cardiology on boardappreciate Dr. Evan Ernandez for the input nothing  Further from their standpoint.    · ECHO: -LV: Estimated LVEF is 55 - 60%. Normal cavity size, wall thickness and systolic function (ejection fraction normal). Age-appropriate left ventricular diastolic function.   Image quality for this study was technically difficult.     40 or above Morbid obesity / Body mass index is 47.65 kg/m².        Acute Delirium vs Severe Anxiety: improving     Ativan PRN  Precedex if above fails but hasn't needed it at all     Code status: Full  Prophylaxis: Lovenox  Recommended Disposition: Home w/Family     I personally spent  35   minutes of critical care time.  This is time spent at this critically ill patient's bedside actively involved in patient care as well as the coordination of care and discussions with the patient's family.  This does not include any procedural time which has been billed separately. Hospital Problems  Date Reviewed: 8/15/2016        Codes Class Noted POA    2nd degree AV block ICD-10-CM: I44.1  ICD-9-CM: 426.13  7/19/2021 Unknown        Pneumonia due to COVID-19 virus ICD-10-CM: U07.1, J12.82  ICD-9-CM: 480.8, 079.89  7/17/2021 Unknown                Review of Systems:   A comprehensive review of systems was negative except for that written in the HPI. Vital Signs:    Last 24hrs VS reviewed since prior progress note. Most recent are:  Visit Vitals  /80   Pulse (!) 112   Temp 98.4 °F (36.9 °C)   Resp (!) 38   Ht 5' 7\" (1.702 m)   Wt 138 kg (304 lb 3.8 oz)   SpO2 94%   BMI 47.65 kg/m²         Intake/Output Summary (Last 24 hours) at 7/22/2021 1155  Last data filed at 7/22/2021 0600  Gross per 24 hour   Intake 250 ml   Output 300 ml   Net -50 ml        Physical Examination:             Constitutional:  No acute distress, cooperative, pleasant , morbidly obese   ENT:  Oral mucous moist, oropharynx benign. Resp:  crackles bilaterally. No wheezing/rhonchi/rales. No accessory muscle use   CV:  Regular rhythm, normal rate, no murmurs, gallops, rubs    GI:  Soft, non distended, non tender. normoactive bowel sounds, no hepatosplenomegaly     Musculoskeletal:  No edema, warm, 2+ pulses throughout    Neurologic:  Moves all extremities. AAOx3, CN II-XII reviewed     Psych:  Good insight, Not anxious nor agitated. Skin:  Good turgor, no rashes or ulcers  Hematologic/Lymphatic/Immunlogic:  No jaundice nor lymph node swelling  :  deferred  Eyes:  EOMI. Anicteric sclerae, PERRL.        Data Review:    Review and/or order of clinical lab test      Labs:     Recent Labs     07/21/21  0529   WBC 6.3   HGB 13.3   HCT 41.6        Recent Labs     07/22/21  0630 07/21/21  0529 07/20/21  0455    144 142   K 3.8 3.9 4.0    106 107   CO2 28 31 29   BUN 16 16 15   CREA 0.80 0.87 0.81   GLU 98 129* 125*   CA 8.8 8.7 8.8   MG  --  2.8*  --    PHOS  --  3.5  --      Recent Labs     07/22/21  0630 07/21/21  0529 07/20/21  0455   * 134* 103*   * 156* 176*   TBILI 0.5 0.4 0.6   TP 7.7 7.4 7.8   ALB 3.2* 3.0* 3.1*   GLOB 4.5* 4.4* 4.7*     No results for input(s): INR, PTP, APTT, INREXT in the last 72 hours. No results for input(s): FE, TIBC, PSAT, FERR in the last 72 hours. No results found for: FOL, RBCF   No results for input(s): PH, PCO2, PO2 in the last 72 hours. No results for input(s): CPK, CKNDX, TROIQ in the last 72 hours.     No lab exists for component: CPKMB  No results found for: CHOL, CHOLX, CHLST, CHOLV, HDL, HDLP, LDL, LDLC, DLDLP, TGLX, TRIGL, TRIGP, CHHD, CHHDX  No results found for: [facility]  Lab Results   Component Value Date/Time    Color YELLOW/STRAW 07/18/2021 02:03 AM    Appearance CLOUDY (A) 07/18/2021 02:03 AM    Specific gravity 1.022 07/18/2021 02:03 AM    Specific gravity >1.030 (H) 08/25/2013 10:10 PM    pH (UA) 5.5 07/18/2021 02:03 AM    Protein 30 (A) 07/18/2021 02:03 AM    Glucose Negative 07/18/2021 02:03 AM    Ketone 15 (A) 07/18/2021 02:03 AM    Bilirubin Negative 07/18/2021 02:03 AM    Urobilinogen 1.0 07/18/2021 02:03 AM    Nitrites Negative 07/18/2021 02:03 AM    Leukocyte Esterase MODERATE (A) 07/18/2021 02:03 AM    Epithelial cells FEW 07/18/2021 02:03 AM    Bacteria Negative 07/18/2021 02:03 AM    WBC 10-20 07/18/2021 02:03 AM    RBC 0-5 07/18/2021 02:03 AM         Medications Reviewed:     Current Facility-Administered Medications   Medication Dose Route Frequency    LORazepam (ATIVAN) injection 1 mg  1 mg IntraVENous Q4H PRN    mupirocin (BACTROBAN) 2 % ointment   Both Nostrils Q12H    guaiFENesin ER (MUCINEX) tablet 600 mg  600 mg Oral Q12H    ascorbic acid (vitamin C) (VITAMIN C) tablet 500 mg  500 mg Oral BID    zinc sulfate (ZINCATE) 50 mg zinc (220 mg) capsule 1 Capsule  1 Capsule Oral DAILY    ipratropium-albuterol (COMBIVENT RESPIMAT) 20 mcg-100 mcg inhalation spray  1 Puff Inhalation Q6H RT    dexamethasone (DECADRON) 4 mg/mL injection 6 mg  6 mg IntraVENous DAILY    sodium chloride (NS) flush 5-40 mL  5-40 mL IntraVENous Q8H    sodium chloride (NS) flush 5-40 mL  5-40 mL IntraVENous PRN    acetaminophen (TYLENOL) tablet 650 mg  650 mg Oral Q6H PRN    Or    acetaminophen (TYLENOL) suppository 650 mg  650 mg Rectal Q6H PRN    polyethylene glycol (MIRALAX) packet 17 g  17 g Oral DAILY PRN    ondansetron (ZOFRAN ODT) tablet 4 mg  4 mg Oral Q8H PRN    Or    ondansetron (ZOFRAN) injection 4 mg  4 mg IntraVENous Q6H PRN    enoxaparin (LOVENOX) injection 40 mg  40 mg SubCUTAneous BID     ______________________________________________________________________  EXPECTED LENGTH OF STAY: 4d 19h  ACTUAL LENGTH OF STAY:          5                 Edin Back MD

## 2021-07-22 NOTE — PROGRESS NOTES
0190 Verbal shift change report given to Tiffany Stiles (oncoming nurse) by Yessi Ratliff RN (offgoing nurse). Report included the following information SBAR, Kardex, Intake/Output, MAR and Cardiac Rhythm NSR with prolonged QTc at 0.48 sec.

## 2021-07-23 LAB
ANION GAP SERPL CALC-SCNC: 6 MMOL/L (ref 5–15)
BASOPHILS # BLD: 0 K/UL (ref 0–0.1)
BASOPHILS NFR BLD: 0 % (ref 0–1)
BUN SERPL-MCNC: 16 MG/DL (ref 6–20)
BUN/CREAT SERPL: 23 (ref 12–20)
CALCIUM SERPL-MCNC: 7.5 MG/DL (ref 8.5–10.1)
CHLORIDE SERPL-SCNC: 108 MMOL/L (ref 97–108)
CO2 SERPL-SCNC: 28 MMOL/L (ref 21–32)
CREAT SERPL-MCNC: 0.71 MG/DL (ref 0.55–1.02)
DIFFERENTIAL METHOD BLD: ABNORMAL
EOSINOPHIL # BLD: 0 K/UL (ref 0–0.4)
EOSINOPHIL NFR BLD: 0 % (ref 0–7)
ERYTHROCYTE [DISTWIDTH] IN BLOOD BY AUTOMATED COUNT: 15.5 % (ref 11.5–14.5)
GLUCOSE SERPL-MCNC: 138 MG/DL (ref 65–100)
HCT VFR BLD AUTO: 40.2 % (ref 35–47)
HGB BLD-MCNC: 12.8 G/DL (ref 11.5–16)
IMM GRANULOCYTES # BLD AUTO: 0 K/UL (ref 0–0.04)
IMM GRANULOCYTES NFR BLD AUTO: 0 % (ref 0–0.5)
LYMPHOCYTES # BLD: 1.7 K/UL (ref 0.8–3.5)
LYMPHOCYTES NFR BLD: 21 % (ref 12–49)
MAGNESIUM SERPL-MCNC: 2.4 MG/DL (ref 1.6–2.4)
MCH RBC QN AUTO: 26.6 PG (ref 26–34)
MCHC RBC AUTO-ENTMCNC: 31.8 G/DL (ref 30–36.5)
MCV RBC AUTO: 83.6 FL (ref 80–99)
MONOCYTES # BLD: 0.7 K/UL (ref 0–1)
MONOCYTES NFR BLD: 8 % (ref 5–13)
NEUTS BAND NFR BLD MANUAL: 1 %
NEUTS SEG # BLD: 5.9 K/UL (ref 1.8–8)
NEUTS SEG NFR BLD: 70 % (ref 32–75)
NRBC # BLD: 0.02 K/UL (ref 0–0.01)
NRBC BLD-RTO: 0.2 PER 100 WBC
PHOSPHATE SERPL-MCNC: 3.7 MG/DL (ref 2.6–4.7)
PLATELET # BLD AUTO: 322 K/UL (ref 150–400)
PMV BLD AUTO: 9.4 FL (ref 8.9–12.9)
POTASSIUM SERPL-SCNC: 3.4 MMOL/L (ref 3.5–5.1)
RBC # BLD AUTO: 4.81 M/UL (ref 3.8–5.2)
RBC MORPH BLD: ABNORMAL
SODIUM SERPL-SCNC: 142 MMOL/L (ref 136–145)
WBC # BLD AUTO: 8.3 K/UL (ref 3.6–11)

## 2021-07-23 PROCEDURE — 97535 SELF CARE MNGMENT TRAINING: CPT | Performed by: OCCUPATIONAL THERAPIST

## 2021-07-23 PROCEDURE — 94640 AIRWAY INHALATION TREATMENT: CPT

## 2021-07-23 PROCEDURE — 65660000000 HC RM CCU STEPDOWN

## 2021-07-23 PROCEDURE — 74011250637 HC RX REV CODE- 250/637: Performed by: GENERAL ACUTE CARE HOSPITAL

## 2021-07-23 PROCEDURE — 84100 ASSAY OF PHOSPHORUS: CPT

## 2021-07-23 PROCEDURE — 77010033711 HC HIGH FLOW OXYGEN

## 2021-07-23 PROCEDURE — 74011250636 HC RX REV CODE- 250/636: Performed by: GENERAL ACUTE CARE HOSPITAL

## 2021-07-23 PROCEDURE — 85025 COMPLETE CBC W/AUTO DIFF WBC: CPT

## 2021-07-23 PROCEDURE — 74011250637 HC RX REV CODE- 250/637: Performed by: NURSE PRACTITIONER

## 2021-07-23 PROCEDURE — 97163 PT EVAL HIGH COMPLEX 45 MIN: CPT

## 2021-07-23 PROCEDURE — 97165 OT EVAL LOW COMPLEX 30 MIN: CPT | Performed by: OCCUPATIONAL THERAPIST

## 2021-07-23 PROCEDURE — 74011250637 HC RX REV CODE- 250/637: Performed by: STUDENT IN AN ORGANIZED HEALTH CARE EDUCATION/TRAINING PROGRAM

## 2021-07-23 PROCEDURE — 97530 THERAPEUTIC ACTIVITIES: CPT

## 2021-07-23 PROCEDURE — 83735 ASSAY OF MAGNESIUM: CPT

## 2021-07-23 PROCEDURE — 36415 COLL VENOUS BLD VENIPUNCTURE: CPT

## 2021-07-23 PROCEDURE — 80048 BASIC METABOLIC PNL TOTAL CA: CPT

## 2021-07-23 RX ORDER — POTASSIUM CHLORIDE 20 MEQ/1
40 TABLET, EXTENDED RELEASE ORAL
Status: COMPLETED | OUTPATIENT
Start: 2021-07-23 | End: 2021-07-23

## 2021-07-23 RX ADMIN — IPRATROPIUM BROMIDE AND ALBUTEROL 1 PUFF: 20; 100 SPRAY, METERED RESPIRATORY (INHALATION) at 13:33

## 2021-07-23 RX ADMIN — GUAIFENESIN 600 MG: 600 TABLET, EXTENDED RELEASE ORAL at 21:24

## 2021-07-23 RX ADMIN — MUPIROCIN: 20 OINTMENT TOPICAL at 21:25

## 2021-07-23 RX ADMIN — OXYCODONE HYDROCHLORIDE AND ACETAMINOPHEN 500 MG: 500 TABLET ORAL at 17:32

## 2021-07-23 RX ADMIN — ENOXAPARIN SODIUM 40 MG: 40 INJECTION SUBCUTANEOUS at 21:24

## 2021-07-23 RX ADMIN — MUPIROCIN: 20 OINTMENT TOPICAL at 09:16

## 2021-07-23 RX ADMIN — Medication 10 ML: at 06:29

## 2021-07-23 RX ADMIN — IPRATROPIUM BROMIDE AND ALBUTEROL 1 PUFF: 20; 100 SPRAY, METERED RESPIRATORY (INHALATION) at 01:55

## 2021-07-23 RX ADMIN — ZINC SULFATE 220 MG (50 MG) CAPSULE 1 CAPSULE: CAPSULE at 09:16

## 2021-07-23 RX ADMIN — POTASSIUM CHLORIDE 40 MEQ: 20 TABLET, EXTENDED RELEASE ORAL at 06:29

## 2021-07-23 RX ADMIN — IPRATROPIUM BROMIDE AND ALBUTEROL 1 PUFF: 20; 100 SPRAY, METERED RESPIRATORY (INHALATION) at 08:00

## 2021-07-23 RX ADMIN — Medication 10 ML: at 17:32

## 2021-07-23 RX ADMIN — DEXAMETHASONE SODIUM PHOSPHATE 6 MG: 4 INJECTION, SOLUTION INTRAMUSCULAR; INTRAVENOUS at 09:16

## 2021-07-23 RX ADMIN — ENOXAPARIN SODIUM 40 MG: 40 INJECTION SUBCUTANEOUS at 09:15

## 2021-07-23 RX ADMIN — GUAIFENESIN 600 MG: 600 TABLET, EXTENDED RELEASE ORAL at 09:16

## 2021-07-23 RX ADMIN — IPRATROPIUM BROMIDE AND ALBUTEROL 1 PUFF: 20; 100 SPRAY, METERED RESPIRATORY (INHALATION) at 20:32

## 2021-07-23 RX ADMIN — Medication 10 ML: at 22:00

## 2021-07-23 RX ADMIN — OXYCODONE HYDROCHLORIDE AND ACETAMINOPHEN 500 MG: 500 TABLET ORAL at 09:15

## 2021-07-23 NOTE — PROGRESS NOTES
Comprehensive Nutrition Assessment    Type and Reason for Visit: Initial, RD nutrition re-screen/LOS    Nutrition Recommendations/Plan:   Continue regular diet  Continue Ensure enlive TID   Please document % meals and supplements consumed in flowsheet I/O's under intake     Nutrition Assessment:      Chart reviewed and case discussed during CCU rounds. Pt noted for covid PNA. MST indicates decreased appetite PTA. No meal intake documented in flow sheet throughout admission, but nursing has consistently reported poor intake during IDR. She may be doing better with the Ensure shakes over meals. RD could not visit pt at bedside d/t covid isolation. Attempted to call pt's room, but she did not answer. Will continue monitoring. Wt Readings from Last 5 Encounters:   07/17/21 138 kg (304 lb 3.8 oz)   07/06/19 134.8 kg (297 lb 2.9 oz)   08/15/16 131.5 kg (290 lb)   02/04/15 118.8 kg (261 lb 14.5 oz)   08/25/13 105.1 kg (231 lb 11.3 oz)   ]    Estimated Daily Nutrient Needs:  Energy (kcal): 6136-6943 kcal (15 kcal/kg); Weight Used for Energy Requirements: Current  Protein (g): 92g (1.5g/kg); Weight Used for Protein Requirements: Ideal  Fluid (ml/day): 1800mL; Method Used for Fluid Requirements: 1 ml/kcal      Nutrition Related Findings:  Labs: K 3.4. Meds: vit C, decadron, zincate, klorcon. BM 7/20 watery.       Wounds:    None       Current Nutrition Therapies:  ADULT DIET Regular  ADULT ORAL NUTRITION SUPPLEMENT Breakfast, Lunch, Dinner; Standard High Calorie/High Protein    Anthropometric Measures:  · Height:  5' 7\" (170.2 cm)  · Current Body Wt:  138 kg (304 lb 3.8 oz)   · Ideal Body Wt:  135 lbs:  225.4 %   · BMI Category:  Obese class 3 (BMI 40.0 or greater)       Nutrition Diagnosis:   · Increased nutrient needs related to impaired respiratory function (large habitus) as evidenced by intake 0-25% (BMI >47)    Nutrition Interventions:   Food and/or Nutrient Delivery: Continue current diet, Continue oral nutrition supplement  Nutrition Education and Counseling: No recommendations at this time  Coordination of Nutrition Care: Continue to monitor while inpatient, Interdisciplinary rounds    Goals:  PO intake >50% meals and ONS next 2-4 days       Nutrition Monitoring and Evaluation:   Behavioral-Environmental Outcomes: None identified  Food/Nutrient Intake Outcomes: Food and nutrient intake, Supplement intake  Physical Signs/Symptoms Outcomes: Biochemical data, Weight, GI status    Discharge Planning:    Continue current diet     Electronically signed by Jorge A Denton RD on 7/23/2021 at 11:04 AM    Contact: [phone]

## 2021-07-23 NOTE — PROGRESS NOTES
1715: Patient arrived in wheelchair to room 2244 from CCU. Patient transferred to recliner chair, O2 sats stable on 12L HFNC but patient tachypnec with RR in mid 35s. HR tachy reaching 120s with activity. Patient anxious with work of breathing. End of Shift Note    Bedside shift change report given to [address] [place] [address] (oncoming nurse) by Scott Hernandez RN (offgoing nurse). Report included the following information SBAR, Kardex, Intake/Output, MAR and Recent Results    Shift worked:  [id number]     Shift summary and any significant changes:    Patient transferred to PCU from CCU. Covid pos. On 12L HFNC. Up in chair. Concerns for physician to address:       Zone phone for oncoming shift:          Activity:  Activity Level: Up with Assistance  Number times ambulated in hallways past shift: 0  Number of times OOB to chair past shift: 1    Cardiac:   Cardiac Monitoring: Yes      Cardiac Rhythm: Sinus Tach    Access:   Current line(s): PIV     Genitourinary:   Urinary status: voiding and external catheter    Respiratory:   O2 Device: Hi flow nasal cannula  Chronic home O2 use?: NO  Incentive spirometer at bedside: NO     GI:  Last Bowel Movement Date: 07/20/21  Current diet:  ADULT DIET Regular  ADULT ORAL NUTRITION SUPPLEMENT Breakfast, Lunch, Dinner; Standard High Calorie/High Protein  Passing flatus: YES  Tolerating current diet: NO       Pain Management:   Patient states pain is manageable on current regimen: YES    Skin:  James Score: 17  Interventions: increase time out of bed and PT/OT consult    Patient Safety:  Fall Score:  Total Score: 3  Interventions: bed/chair alarm and pt to call before getting OOB  High Fall Risk: Yes    Length of Stay:  Expected LOS: 4d 19h  Actual LOS: Linda Moore 130, RN

## 2021-07-23 NOTE — PROGRESS NOTES
0730  Verbal shift change report given to Phoenix Means (oncoming nurse) by Madonna Chan RN (offgoing nurse). Report included the following information Kardex, Intake/Output, Recent Results and Cardiac Rhythm NSR/ST with activity to 120's. R Prince Cory 80 with attending. Will eval for transfer to stepdown given improvement in oxygenation. 1230  Assessment unchanged. Spoke with patient about possible transfer. Pt seemed anxious about leaving CCU. Assurance provided. Relayed that I spoke with her  and updated him on her progress. 1542  Report called to Kelle Chance RN on PCU.    1700  Transferred with monitor to [phone].

## 2021-07-23 NOTE — PROGRESS NOTES
Problem: Self Care Deficits Care Plan (Adult)  Goal: *Acute Goals and Plan of Care (Insert Text)  Description:   FUNCTIONAL STATUS PRIOR TO ADMISSION: pt was independent in adls and mobility. She lives with her  and children. HOME SUPPORT: The patient lived with her family. Occupational Therapy Goals  Initiated 7/23/2021  1. Patient will perform grooming in standing with supervision/set-up within 7 day(s). 2.  Patient will perform bathing with moderate assistance  within 7 day(s). 3.  Patient will perform upper body dressing and lower body dressing with minimal assistance/contact guard assist within 7 day(s). 4.  Patient will perform toilet transfers with modified independence within 7 day(s). 5.  Patient will perform all aspects of toileting with supervision/set-up within 7 day(s). 6.  Patient will participate in upper extremity therapeutic exercise/activities with supervision/set-up for 10 minutes within 7 day(s). 7.  Patient will utilize energy conservation techniques during functional activities with verbal cues within 7 day(s). 8.  Patient will demonstrate independent use of PLB technique during adls within 7 days,. Outcome: Not Met   OCCUPATIONAL THERAPY EVALUATION  Patient: Sirena Butler (89 y.o. female)  Date: 7/23/2021  Primary Diagnosis: Pneumonia due to COVID-19 virus [U07.1, J12.82]        Precautions: COVID +  Fall, Contact (droplet plus)    ASSESSMENT  Based on the objective data described below, the patient presents with mildly decreased balance, generalized weakness, anxiety, and poor functional tolerance/ endurance for adls and mobility, due to Covid infection and need for high flow 15 liters. Pt generally did quite well with her transfers this date, however, she is anxious and HR was up to 170s as well as RR up to 40s. She maintained O2 sats in the 90s on the 15 L high flow.   Pt is functioning well below her baseline independent in adls and IADLs and will benefit from OT services. Will determine recommendations for discharge, based on pt's progress and level of assist available at home. Current Level of Function Impacting Discharge (ADLs/self-care): up to total assist for adls, CGA for basic BSC and to W/C transfer. Functional Outcome Measure: The patient scored Total: 45/100 on the Barthel Index outcome measure which is indicative of 55% impaired ability to care for basic self needs/dependency on others; inferred dependency on others for instrumental ADLs. Other factors to consider for discharge: other family members have/had covid     Patient will benefit from skilled therapy intervention to address the above noted impairments. PLAN :  Recommendations and Planned Interventions: self care training, functional mobility training, therapeutic exercise, balance training, therapeutic activities, endurance activities, patient education, home safety training, and family training/education    Frequency/Duration: Patient will be followed by occupational therapy 3 times a week to address goals. Recommendation for discharge: (in order for the patient to meet his/her long term goals)  To be determined: depending on progress   may need HH? This discharge recommendation:  Has not yet been discussed the attending provider and/or case management    IF patient discharges home will need the following DME: tbd       SUBJECTIVE:   Patient was anxious during tx session    OBJECTIVE DATA SUMMARY:   HISTORY:   Past Medical History:   Diagnosis Date    Cholestasis of pregnancy 4/3/2013    Diabetes mellitus     HX OTHER MEDICAL     cholestasis or pregnancy   No past surgical history on file.     Expanded or extensive additional review of patient history:     Home Situation  Home Environment: Private residence  # Steps to Enter: 3  Rails to Enter: Yes  Hand Rails : Bilateral (far apart)  Wheelchair Ramp: No  One/Two Story Residence: One story  Living Alone: No  Support Systems: Spouse/Significant Other/Partner, Child(oneida)  Patient Expects to be Discharged to[de-identified] House  Current DME Used/Available at Home: Commode, bedside, Crutches, Walker, rolling, Walker, rollator (3in1)  Tub or Shower Type: Shower    Hand dominance: Right    EXAMINATION OF PERFORMANCE DEFICITS:  Cognitive/Behavioral Status:  Neurologic State: Alert  Orientation Level: Oriented X4  Cognition: Appropriate safety awareness; Follows commands  Perception: Appears intact  Perseveration: No perseveration noted  Safety/Judgement: Awareness of environment;Good awareness of safety precautions; Insight into deficits    Skin: appeared intact    Edema: none observed    Hearing: Auditory  Auditory Impairment: None    Vision/Perceptual:                                Corrective Lenses:  (none present)    Range of Motion:  BUEs:    AROM: Within functional limits  PROM: Within functional limits                      Strength:  BUEs:    Strength: Generally decreased, functional                Coordination:  Coordination: Within functional limits  Fine Motor Skills-Upper: Left Intact; Right Intact    Gross Motor Skills-Upper: Left Intact; Right Intact    Tone & Sensation:    Tone: Normal  Sensation: Intact                      Balance:  Sitting: Intact  Standing: Impaired  Standing - Static: Good  Standing - Dynamic : Fair;Occasional    Functional Mobility and Transfers for ADLs:  Bed Mobility:  Rolling: Minimum assistance  Supine to Sit: Minimum assistance  Scooting: Contact guard assistance    Transfers:  Sit to Stand: Contact guard assistance  Stand to Sit: Contact guard assistance  Bed to Chair: Contact guard assistance  Toilet Transfer : Contact guard assistance (toBSC)    ADL Assessment:  Feeding: Independent    Oral Facial Hygiene/Grooming: Stand-by assistance;Setup    Bathing: Maximum assistance    Upper Body Dressing: Maximum assistance    Lower Body Dressing: Total assistance    Toileting:  Moderate assistance (set up and managing gown)                ADL Intervention and task modifications:     Pt performed bed mobiltiy and transfer to bs and W/C with CGA and multiple rest breaks. Educated on PLB during adls- pt will need r einforcement but good effort and response                                  Cognitive Retraining  Safety/Judgement: Awareness of environment;Good awareness of safety precautions; Insight into deficits    Therapeutic Exercise:  PLB education  Functional Measure:  Barthel Index:    Bathin  Bladder: 10  Bowels: 10  Groomin (limited by BROOKS)  Dressin  Feedin  Mobility: 0  Stairs: 0  Toilet Use: 5  Transfer (Bed to Chair and Back): 10  Total: 45/100        The Barthel ADL Index: Guidelines  1. The index should be used as a record of what a patient does, not as a record of what a patient could do. 2. The main aim is to establish degree of independence from any help, physical or verbal, however minor and for whatever reason. 3. The need for supervision renders the patient not independent. 4. A patient's performance should be established using the best available evidence. Asking the patient, friends/relatives and nurses are the usual sources, but direct observation and common sense are also important. However direct testing is not needed. 5. Usually the patient's performance over the preceding 24-48 hours is important, but occasionally longer periods will be relevant. 6. Middle categories imply that the patient supplies over 50 per cent of the effort. 7. Use of aids to be independent is allowed. Echo Galvan., Barthel, D.W. (8738). Functional evaluation: the Barthel Index. [facility] (14)2. [employer] greg GIORGI KulkarniF, Ana Bowels., [facility] Ravinder. [place], [address] [place] [address] Ave (). Measuring the change indisability after inpatient rehabilitation; comparison of the responsiveness of the Barthel Index and Functional [place] Measure.  Journal of Neurology, Neurosurgery, and Psychiatry, 66(4), [phone]. FERNANDO Saba, [place] Gil, & Suly Benitez M.A. (2004.) Assessment of post-stroke quality of life in cost-effectiveness studies: The usefulness of the Barthel Index and the EuroQoL-5D. Quality of Life Research, 15, 793-03         Occupational Therapy Evaluation Charge Determination   History Examination Decision-Making   MEDIUM Complexity : Expanded review of history including physical, cognitive and psychosocial  history  MEDIUM Complexity : 3-5 performance deficits relating to physical, cognitive , or psychosocial skils that result in activity limitations and / or participation restrictions MEDIUM Complexity : Patient may present with comorbidities that affect occupational performnce. Miniml to moderate modification of tasks or assistance (eg, physical or verbal ) with assesment(s) is necessary to enable patient to complete evaluation       Based on the above components, the patient evaluation is determined to be of the following complexity level: MEDIUM  Pain Rating:  No complaints    Activity Tolerance:   Poor, requires frequent rest breaks, and quite anxious during mobility--HR up to 170s and RR into 40s - PLB and rest improved vitals during adls    After treatment patient left in no apparent distress:    Sitting in chair, Call bell within reach, and nursing about to transfer pt to PCU    COMMUNICATION/EDUCATION:   The patients plan of care was discussed with: Physical therapist and Registered nurse. Patient/family have participated as able in goal setting and plan of care. This patients plan of care is appropriate for delegation to [facility].     Thank you for this referral.  Fernando Hinkle OTR/ZULAY  Time Calculation: 49 mins

## 2021-07-23 NOTE — INTERDISCIPLINARY ROUNDS
Interdisciplinary team rounds were held 7/23/2021 with the following team members:Care Management, Diabetes Treatment Specialist, Nursing, Nutrition, Pharmacy, Physical Therapy, Physician and Respiratory Therapy. Plan of care discussed. See clinical pathway and/or care plan for interventions and desired outcomes.

## 2021-07-23 NOTE — PROGRESS NOTES
[facility] Adult  Hospitalist Group                                                                                          Critical Care Progress Note  Cassandra Romero MD  Answering service: [phone] OR [phone] from in house phone        Date of Service:  2021  NAME:  Spencer Looney  :  1980  MRN:  [id number]      Interval history / Subjective:    Down to 30% fio2 and 30 liters, she was able to get out of bed yesterday and go to the commode with  Some dyspnea but no hypoxemia. Assessment & Plan:     Acute hypoxic respiratory failure secondary to COVID-19 PNA  Sepsis  Patient oxygen requirements Decreased to HHFNC as above, wean as tolerated. .  On remdesivir, dexamethasone, no indication for antibiotics, got also ACTEMRA. bronchodilators  Lovenox 40 mg twice daily  Mucinex twice daily  Vitamin C and zinc   Incentive spirometer     Sinus pause x2  Second-degree AV block  Avoid beta-blocker. Cardiology on boardappreciate Dr. Alberto Vance for the input nothing  Further from their standpoint.    · ECHO: -LV: Estimated LVEF is 55 - 60%. Normal cavity size, wall thickness and systolic function (ejection fraction normal). Age-appropriate left ventricular diastolic function. Image quality for this study was technically difficult.     40 or above Morbid obesity / Body mass index is 47.65 kg/m². E Coli UTI:  Completed levaquin course        Severe Anxiety: improving on just prn Ativan that she seldom needs.      Code status: Full  Prophylaxis: Lovenox  Recommended Disposition: Home w/Family        Dispo: Ok to move to stepdown.       Hospital Problems  Date Reviewed: 8/15/2016        Codes Class Noted POA    2nd degree AV block ICD-10-CM: I44.1  ICD-9-CM: 426.13  2021 Unknown        Pneumonia due to COVID-19 virus ICD-10-CM: U07.1, J12.82  ICD-9-CM: 480.8, 079.89  2021 Unknown                Review of Systems:   A comprehensive review of systems was negative except for that written in the HPI. Vital Signs:    Last 24hrs VS reviewed since prior progress note. Most recent are:  Visit Vitals  /74 (BP 1 Location: Right upper arm, BP Patient Position: At rest)   Pulse 99   Temp 98.7 °F (37.1 °C)   Resp 28   Ht 5' 7\" (1.702 m)   Wt 138 kg (304 lb 3.8 oz)   SpO2 98%   BMI 47.65 kg/m²         Intake/Output Summary (Last 24 hours) at 7/23/2021 1224  Last data filed at 7/23/2021 0400  Gross per 24 hour   Intake 400 ml   Output 500 ml   Net -100 ml        Physical Examination:             Constitutional:  No acute distress, cooperative, pleasant    ENT:  Oral mucous moist, oropharynx benign. Resp:  CTA bilaterally. No wheezing/rhonchi/rales. No accessory muscle use   CV:  Regular rhythm, normal rate, no murmurs, gallops, rubs    GI:  Soft, non distended, non tender. normoactive bowel sounds, no hepatosplenomegaly     Musculoskeletal:  No edema, warm, 2+ pulses throughout    Neurologic:  Moves all extremities. AAOx3, CN II-XII reviewed     Psych:  Good insight, Not anxious nor agitated. Skin:  Good turgor, no rashes or ulcers  Hematologic/Lymphatic/Immunlogic:  No jaundice nor lymph node swelling  :  deferred  Eyes:  EOMI. Anicteric sclerae, PERRL. Data Review:    Review and/or order of clinical lab test      Labs:     Recent Labs     07/23/21  0349 07/21/21  0529   WBC 8.3 6.3   HGB 12.8 13.3   HCT 40.2 41.6    284     Recent Labs     07/23/21  0349 07/22/21  0630 07/21/21  0529    142 144   K 3.4* 3.8 3.9    107 106   CO2 28 28 31   BUN 16 16 16   CREA 0.71 0.80 0.87   * 98 129*   CA 7.5* 8.8 8.7   MG 2.4  --  2.8*   PHOS 3.7  --  3.5     Recent Labs     07/22/21  0630 07/21/21  0529   * 134*   * 156*   TBILI 0.5 0.4   TP 7.7 7.4   ALB 3.2* 3.0*   GLOB 4.5* 4.4*     No results for input(s): INR, PTP, APTT, INREXT in the last 72 hours. No results for input(s): FE, TIBC, PSAT, FERR in the last 72 hours.    No results found for: FOL, RBCF   No results for input(s): PH, PCO2, PO2 in the last 72 hours. No results for input(s): CPK, CKNDX, TROIQ in the last 72 hours.     No lab exists for component: CPKMB  No results found for: CHOL, CHOLX, CHLST, CHOLV, HDL, HDLP, LDL, LDLC, DLDLP, TGLX, TRIGL, TRIGP, CHHD, CHHDX  No results found for: [facility]  Lab Results   Component Value Date/Time    Color YELLOW/STRAW 07/18/2021 02:03 AM    Appearance CLOUDY (A) 07/18/2021 02:03 AM    Specific gravity 1.022 07/18/2021 02:03 AM    Specific gravity >1.030 (H) 08/25/2013 10:10 PM    pH (UA) 5.5 07/18/2021 02:03 AM    Protein 30 (A) 07/18/2021 02:03 AM    Glucose Negative 07/18/2021 02:03 AM    Ketone 15 (A) 07/18/2021 02:03 AM    Bilirubin Negative 07/18/2021 02:03 AM    Urobilinogen 1.0 07/18/2021 02:03 AM    Nitrites Negative 07/18/2021 02:03 AM    Leukocyte Esterase MODERATE (A) 07/18/2021 02:03 AM    Epithelial cells FEW 07/18/2021 02:03 AM    Bacteria Negative 07/18/2021 02:03 AM    WBC 10-20 07/18/2021 02:03 AM    RBC 0-5 07/18/2021 02:03 AM         Medications Reviewed:     Current Facility-Administered Medications   Medication Dose Route Frequency    LORazepam (ATIVAN) injection 1 mg  1 mg IntraVENous Q4H PRN    mupirocin (BACTROBAN) 2 % ointment   Both Nostrils Q12H    guaiFENesin ER (MUCINEX) tablet 600 mg  600 mg Oral Q12H    ascorbic acid (vitamin C) (VITAMIN C) tablet 500 mg  500 mg Oral BID    zinc sulfate (ZINCATE) 50 mg zinc (220 mg) capsule 1 Capsule  1 Capsule Oral DAILY    ipratropium-albuterol (COMBIVENT RESPIMAT) 20 mcg-100 mcg inhalation spray  1 Puff Inhalation Q6H RT    dexamethasone (DECADRON) 4 mg/mL injection 6 mg  6 mg IntraVENous DAILY    sodium chloride (NS) flush 5-40 mL  5-40 mL IntraVENous Q8H    sodium chloride (NS) flush 5-40 mL  5-40 mL IntraVENous PRN    acetaminophen (TYLENOL) tablet 650 mg  650 mg Oral Q6H PRN    Or    acetaminophen (TYLENOL) suppository 650 mg  650 mg Rectal Q6H PRN    polyethylene glycol (MIRALAX) packet 17 g  17 g Oral DAILY PRN    ondansetron (ZOFRAN ODT) tablet 4 mg  4 mg Oral Q8H PRN    Or    ondansetron (ZOFRAN) injection 4 mg  4 mg IntraVENous Q6H PRN    enoxaparin (LOVENOX) injection 40 mg  40 mg SubCUTAneous BID     ______________________________________________________________________  EXPECTED LENGTH OF STAY: 4d 19h  ACTUAL LENGTH OF STAY:          6                 Jose Alegria MD

## 2021-07-23 NOTE — PROGRESS NOTES
Care Management:    Transition of Care Plan:  RUR: 9%  Disposition: Plan Home with [address].  Possible HH. ..     Follow up appointments: PCP   DME needed: TBD pending O2 needs at d/c  Transportation at [address] or means to access home:  to provide      IM Medicare letter: n/a commercial insurance   Caregiver Contact: Mother Jake Martinez [phone]  Discharge Caregiver contacted prior to discharge?     Admitted with COVID . She is currently on midflow 02 in the ICU. Pt, , and 3 children (ages 23, 13, and 6) live in a single level home with 5 aden. Pt has no DME. At baseline pt is fully independent with ADL's and driving.  Pt has no hx of HH or rehab.     CM will continue to monitor discharge plan.

## 2021-07-23 NOTE — PROGRESS NOTES
1900 - Bedside shift change report given to JERI Potter (oncoming nurse) by Negra Crow RN (offgoing nurse). Report included the following information SBAR, Kardex, Intake/Output, MAR and Cardiac Rhythm NSR.     2000 - Shift assessment complete, see flowsheets for details. Pt alert and oriented x4, moves all extremities purposefully. NSR on monitor, ST when moving around. Lung sounds diminished throughout, currently on heated HFNC. ABD semi-soft, BS active. Purwick in place. Pulses palpable in all extremities. No complaints of pain or SOB at this time. 0000 - Reassessment complete, no changes to previous assessment. 0400 - Reassessment complete, no changes to previous assessment. Morning labs drawn and sent. 0700 - End of Shift Note    Bedside shift change report given to Drea Jaramillo (oncoming nurse) by Tere Harrison (offgoing nurse).   Report included the following information SBAR, Kardex, Intake/Output, MAR, Recent Results and Cardiac Rhythm NSR    Shift worked:  [id number]     Shift summary and any significant changes:     N/A     Concerns for physician to address:  N/A     Zone phone for oncoming shift:   N/A       Activity:  Activity Level: Bed Rest  Number times ambulated in hallways past shift: 0  Number of times OOB to chair past shift: 0    Cardiac:   Cardiac Monitoring: Yes      Cardiac Rhythm: Sinus Rhythm    Access:   Current line(s): PIV     Genitourinary:   Urinary status: voiding and external catheter    Respiratory:   O2 Device: Heated, Hi flow nasal cannula  Chronic home O2 use?: NO  Incentive spirometer at bedside: NO     GI:  Last Bowel Movement Date: 07/20/21  Current diet:  ADULT DIET Regular  ADULT ORAL NUTRITION SUPPLEMENT Breakfast, Lunch, Dinner; Standard High Calorie/High Protein  Passing flatus: YES  Tolerating current diet: YES       Pain Management:   Patient states pain is manageable on current regimen: YES    Skin:  James Score: 15  Interventions: float heels, increase time out of bed and internal/external urinary devices    Patient Safety:  Fall Score:  Total Score: 3  Interventions: bed/chair alarm  High Fall Risk: Yes    Length of Stay:  Expected LOS: 4d 19h  Actual LOS: [address] [facility]

## 2021-07-23 NOTE — PROGRESS NOTES
Problem: Mobility Impaired (Adult and Pediatric)  Goal: *Acute Goals and Plan of Care (Insert Text)  Description: FUNCTIONAL STATUS PRIOR TO ADMISSION: Patient was independent and active without use of DME. Patient was independent for basic and instrumental ADLs. HOME SUPPORT PRIOR TO ADMISSION: The patient lived with family but did not require assist. Lives in 1 story home with spouse and child. Physical Therapy Goals  Initiated 7/23/2021  1. Patient will move from supine to sit and sit to supine , scoot up and down, and roll side to side in bed with independence within 7 day(s). 2.  Patient will transfer from bed to chair and chair to bed with independence using the least restrictive device within 7 day(s). 3.  Patient will perform sit to stand with independence within 7 day(s). 4.  Patient will ambulate with independence for 125 feet with the least restrictive device within 7 day(s). 5.  Patient will ascend/descend 3 stairs with 1 handrail(s) with minimal assistance/contact guard assist within 7 day(s). Outcome: Not Met  PHYSICAL THERAPY EVALUATION  Patient: Cornell Theodore (5 y.o. female)  Date: 7/23/2021  Primary Diagnosis: Pneumonia due to COVID-19 virus [U07.1, J12.82]        Precautions:  Fall, Contact (droplet plus)    ASSESSMENT  Based on the objective data described below, the patient presents with generalized weakness, decreased activity tolerance due to sob with minimal exertion, rapid HR at rest and with transfers - 130 at rest and peaked at 172 during transfers, fair standing balance and decreased mobility skills below independent/active baseline. She was able to move from supine to sitting to bsc and then to wheelchair with frequent rests and cues to use PLB to keep sats >90%, which intermittently decreased to 83-88% during movement. She was left in wheelchair with intension of transporting to PCU in the next hour.      Current Level of Function Impacting Discharge (mobility/balance): min a to cg assistance for bed mobility and transfers. Functional Outcome Measure: The patient scored 45/100 on the Barthel outcome measure which is indicative of 55% functional impairment. Other factors to consider for discharge: still dependent on HF O2; independent baseline; good home support. Patient will benefit from skilled therapy intervention to address the above noted impairments. PLAN :  Recommendations and Planned Interventions: bed mobility training, transfer training, gait training, therapeutic exercises, edema management/control, patient and family training/education, and therapeutic activities      Frequency/Duration: Patient will be followed by physical therapy:  3 times a week to address goals. Recommendation for discharge: (in order for the patient to meet his/her long term goals)  Physical therapy at least 2 days/week in the home AND ensure assist and/or supervision for safety with mobility and ADLs vs IPR pending progress. This discharge recommendation:  Has not yet been discussed the attending provider and/or case management    IF patient discharges home will need the following DME: to be determined (TBD) - possibly O2        SUBJECTIVE:   Patient stated  It's been really stressful going through this.     OBJECTIVE DATA SUMMARY:   HISTORY:    Past Medical History:   Diagnosis Date    Cholestasis of pregnancy 4/3/2013    Diabetes mellitus     HX OTHER MEDICAL     cholestasis or pregnancy   No past surgical history on file.     Personal factors and/or comorbidities impacting plan of care: covid pneumonia    Home Situation  Home Environment: Private residence  # Steps to Enter: 3  Rails to Enter: Yes  Hand Rails : Bilateral (far apart)  Wheelchair Ramp: No  One/Two Story Residence: One story  Living Alone: No  Support Systems: Spouse/Significant Other/Partner, Child(oneida)  Patient Expects to be Discharged to[de-identified] House  Current DME Used/Available at Home: Commode, bedside, Crutches, Walker, rolling, Walker, rollator (3in1)  Tub or Shower Type: Shower    EXAMINATION/PRESENTATION/DECISION MAKING:   Critical Behavior:  Neurologic State: Alert  Orientation Level: Oriented X4  Cognition: Appropriate decision making, Appropriate for age attention/concentration, Appropriate safety awareness, Follows commands  Safety/Judgement: Good awareness of safety precautions, Awareness of environment  Hearing: Auditory  Auditory Impairment: None  Skin:  no findings  Edema: mild B legs  Range Of Motion:  AROM: Within functional limits           PROM: Within functional limits           Strength:    Strength: Generally decreased, functional                    Tone & Sensation:   Tone: Normal              Sensation: Intact               Coordination:  Coordination: Within functional limits  Vision:    WFL  Functional Mobility:  Bed Mobility:  Rolling: Minimum assistance  Supine to Sit: Minimum assistance     Scooting: Contact guard assistance  Transfers:  Sit to Stand: Contact guard assistance  Stand to Sit: Contact guard assistance        Bed to Chair: Contact guard assistance              Balance:   Sitting: Intact  Standing: Impaired  Standing - Static: Good  Standing - Dynamic : Fair;Occasional  Ambulation/Gait Training:              Gait Description (WDL):  (took some steps from bed to bsc, then to wheelchair.)     Right Side Weight Bearing: Full  Left Side Weight Bearing: Full                          Functional Measure:  Barthel Index:    Bathin  Bladder: 10  Bowels: 10  Groomin (limited by BROOKS)  Dressin  Feedin  Mobility: 0  Stairs: 0  Toilet Use: 5  Transfer (Bed to Chair and Back): 10  Total: 45/100       The Barthel ADL Index: Guidelines  1. The index should be used as a record of what a patient does, not as a record of what a patient could do.   2. The main aim is to establish degree of independence from any help, physical or verbal, however minor and for whatever reason. 3. The need for supervision renders the patient not independent. 4. A patient's performance should be established using the best available evidence. Asking the patient, friends/relatives and nurses are the usual sources, but direct observation and common sense are also important. However direct testing is not needed. 5. Usually the patient's performance over the preceding 24-48 hours is important, but occasionally longer periods will be relevant. 6. Middle categories imply that the patient supplies over 50 per cent of the effort. 7. Use of aids to be independent is allowed. Natalee Sosa., Barthel, DShanelleW. (0717). Functional evaluation: the Barthel Index. [facility] (14)2. Cornelia Marrufo greg GIORGI KulkarniF, Catie Belle., Samir Christian., Meme, [address] (1999). Measuring the change indisability after inpatient rehabilitation; comparison of the responsiveness of the Barthel Index and Functional [place] Measure. Journal of Neurology, Neurosurgery, and Psychiatry, 66(4), 410-740. Leopold Lark, [place]WERNER, [place] Gil, & Blanca Ramos MANANYA. (2004.) Assessment of post-stroke quality of life in cost-effectiveness studies: The usefulness of the Barthel Index and the EuroQoL-5D.  Quality of Life Research, 15, 953-37        Physical Therapy Evaluation Charge Determination   History Examination Presentation Decision-Making   MEDIUM  Complexity : 1-2 comorbidities / personal factors will impact the outcome/ POC  HIGH Complexity : 4+ Standardized tests and measures addressing body structure, function, activity limitation and / or participation in recreation  HIGH Complexity : Unstable and unpredictable characteristics  Other outcome measures Barthel  MEDIUM      Based on the above components, the patient evaluation is determined to be of the following complexity level: MEDIUM    Pain Rating:  None reported during session    Activity Tolerance:   Poor, desaturates with exertion and requires oxygen, requires frequent rest breaks, observed SOB with activity, and tachycardia    After treatment patient left in no apparent distress:   Sitting in chair and Call bell within reach    COMMUNICATION/EDUCATION:   The patients plan of care was discussed with: Occupational therapist and Registered nurse. Fall prevention education was provided and the patient/caregiver indicated understanding., Patient/family have participated as able in goal setting and plan of care. , and Patient/family agree to work toward stated goals and plan of care.     Thank you for this referral.  Candy Dickson, PT   Time Calculation: 47 mins

## 2021-07-23 NOTE — PROGRESS NOTES
Spiritual Care Assessment/Progress Note  [facility]      NAME: Alfred Sprague      MRN: [id number]  AGE: 47 y.o. SEX: female  Congregation Affiliation: [employer]   Language: English     7/23/2021     Total Time (in minutes): 6     Spiritual Assessment begun in [facility] CRITICAL [facility]ARE 2 through conversation with:         []Patient        [] Family    [] Friend(s)        Reason for Consult: Initial visit     Spiritual beliefs: (Please include comment if needed)     [] Identifies with a aracely tradition:         [] Supported by a aracely community:            [] Claims no spiritual orientation:           [] Seeking spiritual identity:                [] Adheres to an individual form of spirituality:           [x] Not able to assess:                           Identified resources for coping:      [] Prayer                               [] Music                  [] Guided Imagery     [] Family/friends                 [] Pet visits     [] Devotional reading                         [x] Unknown     [] Other:                                              Interventions offered during this visit: (See comments for more details)                Plan of Care:     [] Support spiritual and/or cultural needs    [] Support AMD and/or advance care planning process      [] Support grieving process   [] Coordinate Rites and/or Rituals    [] Coordination with community clergy   [] No spiritual needs identified at this time   [] Detailed Plan of Care below (See Comments)  [] Make referral to Music Therapy  [] Make referral to Pet Therapy     [] Make referral to Addiction services  [] Make referral to [facility]  [] Make referral to Spiritual Care Partner  [] No future visits requested        [] Follow up upon further referrals     Comments: Attempted Initial Spiritual Assessment for this pt in [facility] 2759. Reviewed pt's chart prior to this visit to augment any observed or expressed support needs this pt may have.   Pt was unable to be assessed at this time. No family/friends present at time of visit. Contact Spiritual Care Services for any spiritual or emotional support needs. Latrice Lau MDiv.  Staff   Request  Support/Spiritual Care Services via [address] [place] [address]

## 2021-07-24 LAB
ANION GAP SERPL CALC-SCNC: 6 MMOL/L (ref 5–15)
BASOPHILS # BLD: 0 K/UL (ref 0–0.1)
BASOPHILS NFR BLD: 0 % (ref 0–1)
BUN SERPL-MCNC: 16 MG/DL (ref 6–20)
BUN/CREAT SERPL: 21 (ref 12–20)
CALCIUM SERPL-MCNC: 9.1 MG/DL (ref 8.5–10.1)
CHLORIDE SERPL-SCNC: 103 MMOL/L (ref 97–108)
CO2 SERPL-SCNC: 30 MMOL/L (ref 21–32)
CREAT SERPL-MCNC: 0.77 MG/DL (ref 0.55–1.02)
DIFFERENTIAL METHOD BLD: ABNORMAL
EOSINOPHIL # BLD: 0.3 K/UL (ref 0–0.4)
EOSINOPHIL NFR BLD: 2 % (ref 0–7)
ERYTHROCYTE [DISTWIDTH] IN BLOOD BY AUTOMATED COUNT: 15.5 % (ref 11.5–14.5)
GLUCOSE SERPL-MCNC: 122 MG/DL (ref 65–100)
HCT VFR BLD AUTO: 46.8 % (ref 35–47)
HGB BLD-MCNC: 14.8 G/DL (ref 11.5–16)
IMM GRANULOCYTES # BLD AUTO: 0 K/UL (ref 0–0.04)
IMM GRANULOCYTES NFR BLD AUTO: 0 % (ref 0–0.5)
LYMPHOCYTES # BLD: 2.3 K/UL (ref 0.8–3.5)
LYMPHOCYTES NFR BLD: 18 % (ref 12–49)
MAGNESIUM SERPL-MCNC: 2.2 MG/DL (ref 1.6–2.4)
MCH RBC QN AUTO: 26.4 PG (ref 26–34)
MCHC RBC AUTO-ENTMCNC: 31.6 G/DL (ref 30–36.5)
MCV RBC AUTO: 83.4 FL (ref 80–99)
MONOCYTES # BLD: 1.2 K/UL (ref 0–1)
MONOCYTES NFR BLD: 9 % (ref 5–13)
NEUTS SEG # BLD: 9 K/UL (ref 1.8–8)
NEUTS SEG NFR BLD: 71 % (ref 32–75)
NRBC # BLD: 0 K/UL (ref 0–0.01)
NRBC BLD-RTO: 0 PER 100 WBC
PHOSPHATE SERPL-MCNC: 5 MG/DL (ref 2.6–4.7)
PLATELET # BLD AUTO: 360 K/UL (ref 150–400)
PMV BLD AUTO: 9.3 FL (ref 8.9–12.9)
POTASSIUM SERPL-SCNC: 4.1 MMOL/L (ref 3.5–5.1)
RBC # BLD AUTO: 5.61 M/UL (ref 3.8–5.2)
RBC MORPH BLD: ABNORMAL
SODIUM SERPL-SCNC: 139 MMOL/L (ref 136–145)
WBC # BLD AUTO: 12.8 K/UL (ref 3.6–11)

## 2021-07-24 PROCEDURE — 77010033711 HC HIGH FLOW OXYGEN

## 2021-07-24 PROCEDURE — 94640 AIRWAY INHALATION TREATMENT: CPT

## 2021-07-24 PROCEDURE — 74011250636 HC RX REV CODE- 250/636: Performed by: INTERNAL MEDICINE

## 2021-07-24 PROCEDURE — 80048 BASIC METABOLIC PNL TOTAL CA: CPT

## 2021-07-24 PROCEDURE — 85025 COMPLETE CBC W/AUTO DIFF WBC: CPT

## 2021-07-24 PROCEDURE — 74011250636 HC RX REV CODE- 250/636: Performed by: GENERAL ACUTE CARE HOSPITAL

## 2021-07-24 PROCEDURE — 2709999900 HC NON-CHARGEABLE SUPPLY

## 2021-07-24 PROCEDURE — 36415 COLL VENOUS BLD VENIPUNCTURE: CPT

## 2021-07-24 PROCEDURE — 84100 ASSAY OF PHOSPHORUS: CPT

## 2021-07-24 PROCEDURE — 74011250637 HC RX REV CODE- 250/637: Performed by: STUDENT IN AN ORGANIZED HEALTH CARE EDUCATION/TRAINING PROGRAM

## 2021-07-24 PROCEDURE — 74011250637 HC RX REV CODE- 250/637: Performed by: GENERAL ACUTE CARE HOSPITAL

## 2021-07-24 PROCEDURE — 65660000000 HC RM CCU STEPDOWN

## 2021-07-24 PROCEDURE — 83735 ASSAY OF MAGNESIUM: CPT

## 2021-07-24 RX ADMIN — ENOXAPARIN SODIUM 40 MG: 40 INJECTION SUBCUTANEOUS at 22:17

## 2021-07-24 RX ADMIN — GUAIFENESIN 600 MG: 600 TABLET, EXTENDED RELEASE ORAL at 22:17

## 2021-07-24 RX ADMIN — MUPIROCIN: 20 OINTMENT TOPICAL at 22:17

## 2021-07-24 RX ADMIN — OXYCODONE HYDROCHLORIDE AND ACETAMINOPHEN 500 MG: 500 TABLET ORAL at 11:32

## 2021-07-24 RX ADMIN — GUAIFENESIN 600 MG: 600 TABLET, EXTENDED RELEASE ORAL at 11:32

## 2021-07-24 RX ADMIN — Medication 10 ML: at 22:18

## 2021-07-24 RX ADMIN — IPRATROPIUM BROMIDE AND ALBUTEROL 1 PUFF: 20; 100 SPRAY, METERED RESPIRATORY (INHALATION) at 19:55

## 2021-07-24 RX ADMIN — MUPIROCIN: 20 OINTMENT TOPICAL at 11:30

## 2021-07-24 RX ADMIN — OXYCODONE HYDROCHLORIDE AND ACETAMINOPHEN 500 MG: 500 TABLET ORAL at 18:23

## 2021-07-24 RX ADMIN — ZINC SULFATE 220 MG (50 MG) CAPSULE 1 CAPSULE: CAPSULE at 11:32

## 2021-07-24 RX ADMIN — IPRATROPIUM BROMIDE AND ALBUTEROL 1 PUFF: 20; 100 SPRAY, METERED RESPIRATORY (INHALATION) at 13:48

## 2021-07-24 RX ADMIN — DEXAMETHASONE SODIUM PHOSPHATE 6 MG: 4 INJECTION, SOLUTION INTRAMUSCULAR; INTRAVENOUS at 11:32

## 2021-07-24 RX ADMIN — IPRATROPIUM BROMIDE AND ALBUTEROL 1 PUFF: 20; 100 SPRAY, METERED RESPIRATORY (INHALATION) at 08:30

## 2021-07-24 RX ADMIN — Medication 10 ML: at 15:30

## 2021-07-24 RX ADMIN — IPRATROPIUM BROMIDE AND ALBUTEROL 1 PUFF: 20; 100 SPRAY, METERED RESPIRATORY (INHALATION) at 02:00

## 2021-07-24 RX ADMIN — ENOXAPARIN SODIUM 40 MG: 40 INJECTION SUBCUTANEOUS at 11:33

## 2021-07-24 NOTE — PROGRESS NOTES
Pt de-sating to the 70's, HR in 140s. Upon entering room pt had her nasal cannula on her forehead, tachypneic with use of accessory muscles. Hiflow placed back and turned to 13 liters. O2 now 96 percent on 12 liters. HR 98. Educated on leaving O2 on, will continue to monitor.

## 2021-07-24 NOTE — PROGRESS NOTES
Hospitalist Progress Note    NAME: Nat Norwood   :  1980   MRN:  [id number]       Assessment / Plan:  Acute hypoxic respiratory failure POA  Covid-19 PNA  -S/p IV remdesivir  -cont IV Decadron  -On HFNC O2 support, weaning down, currently on 12 L  -Patient no distress, speaking full sentences no accessory muscle use  -Continue PT/OT  -Sinus tachycardia 2/2 respiratory issues, had brief episodic bradycardia few days ago, likely vagally mediated  -Mobilize  -We will need O2 screening prior to DC  -Continue to wean O2 as able maintaining sats > 90    Obesity  Body mass index is 47.65 kg/m². -weight loss counseling    Estimated discharge date:     Code status: Full  Prophylaxis: Lovenox     Subjective:     Chief Complaint / Reason for Physician Visit  Patient transferred out of ICU to stepdown, speaking full sentences without distress, on 12 L nasal cannula    discussed with RN events overnight. Review of Systems:  Symptom Y/N Comments  Symptom Y/N Comments   Fever/Chills n   Chest Pain n    Poor Appetite n   Edema     Cough n   Abdominal Pain n    Sputum n   Joint Pain     SOB/BROOKS n   Pruritis/Rash     Nausea/vomit n   Tolerating PT/OT     Diarrhea    Tolerating Diet y    Constipation    Other       Could NOT obtain due to:      Objective:     VITALS:   Last 24hrs VS reviewed since prior progress note.  Most recent are:  Patient Vitals for the past 24 hrs:   Temp Pulse Resp BP SpO2   21 2033     97 %   21 1946 98.4 °F (36.9 °C) (!) 107 30 122/81 98 %   21 1718 98.7 °F (37.1 °C) (!) 115 28 107/84 94 %   21 1517     98 %   21 1400  (!) 104 (!) 31  96 %   21 1333     98 %   21 1300  67 18 129/69 99 %   21 1200 97.8 °F (36.6 °C) 79 21 125/78 98 %   21 1128     98 %   21 1100  82 20 118/77 100 %   21 1000  (!) 114 (!) 37 122/74 93 %   21 0933     95 %   21 0900  100 (!) 34 124/71 98 %   21 0857 98.7 °F (37.1 °C)       07/23/21 0800 97.8 °F (36.6 °C) 99 28 126/74 96 %   07/23/21 0759     98 %   07/23/21 0700  86 20 115/82 96 %   07/23/21 0600  75 25 114/80 95 %   07/23/21 0500  64 15 125/80 97 %   07/23/21 0400 98.6 °F (37 °C) 79 26 128/83 98 %   07/23/21 0300  66 22 115/88 98 %   07/23/21 0200  84 28 133/82 96 %   07/23/21 0155     97 %   07/23/21 0100  67 23 (!) 138/107 96 %   07/23/21 0000 98.9 °F (37.2 °C) 69 22 125/82 98 %   07/22/21 2300  80 28 133/77 98 %   07/22/21 2200  78 24 128/83 97 %       Intake/Output Summary (Last 24 hours) at 7/23/2021 2147  Last data filed at 7/23/2021 1946  Gross per 24 hour   Intake 650 ml   Output 201 ml   Net 449 ml        I had a face to face encounter and independently examined this patient on 7/23/2021, as outlined below:  PHYSICAL EXAM:  General: WD, WN. Alert, cooperative, no acute distress    EENT:  EOMI. Anicteric sclerae. MMM  Resp:  CTA bilaterally, no wheezing or rales. No accessory muscle use  CV:  Regular  rhythm,  No edema  GI:  Soft, Non distended, Non tender. +Bowel sounds  Neurologic:  Alert and oriented X 3, normal speech,   Psych:   Good insight. Not anxious nor agitated  Skin:  No rashes. No jaundice    Reviewed most current lab test results and cultures  YES  Reviewed most current radiology test results   YES  Review and summation of old records today    NO  Reviewed patient's current orders and MAR    YES  PMH/SH reviewed - no change compared to H&P  ________________________________________________________________________  Care Plan discussed with:    Comments   Patient x    Family      RN x    Care Manager     Consultant                        Multidiciplinary team rounds were held today with , nursing, pharmacist and clinical coordinator. Patient's plan of care was discussed; medications were reviewed and discharge planning was addressed. ________________________________________________________________________  Total NON critical care TIME:  35   Minutes    Total CRITICAL CARE TIME Spent:   Minutes non procedure based      Comments   >50% of visit spent in counseling and coordination of care x    ________________________________________________________________________  Ravi Cheek DO     Procedures: see electronic medical records for all procedures/Xrays and details which were not copied into this note but were reviewed prior to creation of Plan. LABS:  I reviewed today's most current labs and imaging studies.   Pertinent labs include:  Recent Labs     07/23/21 0349 07/21/21  0529   WBC 8.3 6.3   HGB 12.8 13.3   HCT 40.2 41.6    284     Recent Labs     07/23/21  0349 07/22/21  0630 07/21/21  0529    142 144   K 3.4* 3.8 3.9    107 106   CO2 28 28 31   * 98 129*   BUN 16 16 16   CREA 0.71 0.80 0.87   CA 7.5* 8.8 8.7   MG 2.4  --  2.8*   PHOS 3.7  --  3.5   ALB  --  3.2* 3.0*   TBILI  --  0.5 0.4   ALT  --  138* 134*       Signed: Diomedes Sheikh DO

## 2021-07-25 LAB
BASOPHILS # BLD: 0 K/UL (ref 0–0.1)
BASOPHILS NFR BLD: 0 % (ref 0–1)
DIFFERENTIAL METHOD BLD: ABNORMAL
EOSINOPHIL # BLD: 0.2 K/UL (ref 0–0.4)
EOSINOPHIL NFR BLD: 2 % (ref 0–7)
ERYTHROCYTE [DISTWIDTH] IN BLOOD BY AUTOMATED COUNT: 15.3 % (ref 11.5–14.5)
HCT VFR BLD AUTO: 46.6 % (ref 35–47)
HGB BLD-MCNC: 14.6 G/DL (ref 11.5–16)
IMM GRANULOCYTES # BLD AUTO: 0 K/UL (ref 0–0.04)
IMM GRANULOCYTES NFR BLD AUTO: 0 % (ref 0–0.5)
LYMPHOCYTES # BLD: 2.9 K/UL (ref 0.8–3.5)
LYMPHOCYTES NFR BLD: 23 % (ref 12–49)
MAGNESIUM SERPL-MCNC: 2.4 MG/DL (ref 1.6–2.4)
MCH RBC QN AUTO: 25.8 PG (ref 26–34)
MCHC RBC AUTO-ENTMCNC: 31.3 G/DL (ref 30–36.5)
MCV RBC AUTO: 82.5 FL (ref 80–99)
METAMYELOCYTES NFR BLD MANUAL: 1 %
MONOCYTES # BLD: 0.6 K/UL (ref 0–1)
MONOCYTES NFR BLD: 5 % (ref 5–13)
NEUTS SEG # BLD: 8.6 K/UL (ref 1.8–8)
NEUTS SEG NFR BLD: 69 % (ref 32–75)
NRBC # BLD: 0 K/UL (ref 0–0.01)
NRBC BLD-RTO: 0 PER 100 WBC
PHOSPHATE SERPL-MCNC: 4.9 MG/DL (ref 2.6–4.7)
PLATELET # BLD AUTO: 392 K/UL (ref 150–400)
PMV BLD AUTO: 9 FL (ref 8.9–12.9)
RBC # BLD AUTO: 5.65 M/UL (ref 3.8–5.2)
RBC MORPH BLD: ABNORMAL
WBC # BLD AUTO: 12.4 K/UL (ref 3.6–11)

## 2021-07-25 PROCEDURE — 83735 ASSAY OF MAGNESIUM: CPT

## 2021-07-25 PROCEDURE — 74011250636 HC RX REV CODE- 250/636: Performed by: INTERNAL MEDICINE

## 2021-07-25 PROCEDURE — 74011250636 HC RX REV CODE- 250/636: Performed by: GENERAL ACUTE CARE HOSPITAL

## 2021-07-25 PROCEDURE — 74011250637 HC RX REV CODE- 250/637: Performed by: GENERAL ACUTE CARE HOSPITAL

## 2021-07-25 PROCEDURE — 84100 ASSAY OF PHOSPHORUS: CPT

## 2021-07-25 PROCEDURE — 94640 AIRWAY INHALATION TREATMENT: CPT

## 2021-07-25 PROCEDURE — 36415 COLL VENOUS BLD VENIPUNCTURE: CPT

## 2021-07-25 PROCEDURE — 74011250637 HC RX REV CODE- 250/637: Performed by: STUDENT IN AN ORGANIZED HEALTH CARE EDUCATION/TRAINING PROGRAM

## 2021-07-25 PROCEDURE — 85025 COMPLETE CBC W/AUTO DIFF WBC: CPT

## 2021-07-25 PROCEDURE — 65660000000 HC RM CCU STEPDOWN

## 2021-07-25 RX ADMIN — LORAZEPAM 1 MG: 2 INJECTION INTRAMUSCULAR; INTRAVENOUS at 13:07

## 2021-07-25 RX ADMIN — GUAIFENESIN 600 MG: 600 TABLET, EXTENDED RELEASE ORAL at 08:21

## 2021-07-25 RX ADMIN — ENOXAPARIN SODIUM 40 MG: 40 INJECTION SUBCUTANEOUS at 22:02

## 2021-07-25 RX ADMIN — DEXAMETHASONE SODIUM PHOSPHATE 6 MG: 4 INJECTION, SOLUTION INTRAMUSCULAR; INTRAVENOUS at 08:21

## 2021-07-25 RX ADMIN — ENOXAPARIN SODIUM 40 MG: 40 INJECTION SUBCUTANEOUS at 08:21

## 2021-07-25 RX ADMIN — MUPIROCIN: 20 OINTMENT TOPICAL at 08:24

## 2021-07-25 RX ADMIN — OXYCODONE HYDROCHLORIDE AND ACETAMINOPHEN 500 MG: 500 TABLET ORAL at 08:21

## 2021-07-25 RX ADMIN — IPRATROPIUM BROMIDE AND ALBUTEROL 1 PUFF: 20; 100 SPRAY, METERED RESPIRATORY (INHALATION) at 01:31

## 2021-07-25 RX ADMIN — IPRATROPIUM BROMIDE AND ALBUTEROL 1 PUFF: 20; 100 SPRAY, METERED RESPIRATORY (INHALATION) at 08:35

## 2021-07-25 RX ADMIN — Medication 10 ML: at 13:07

## 2021-07-25 RX ADMIN — Medication 10 ML: at 22:02

## 2021-07-25 RX ADMIN — OXYCODONE HYDROCHLORIDE AND ACETAMINOPHEN 500 MG: 500 TABLET ORAL at 17:00

## 2021-07-25 RX ADMIN — ZINC SULFATE 220 MG (50 MG) CAPSULE 1 CAPSULE: CAPSULE at 08:21

## 2021-07-25 RX ADMIN — GUAIFENESIN 600 MG: 600 TABLET, EXTENDED RELEASE ORAL at 22:01

## 2021-07-25 NOTE — PROGRESS NOTES
Problem: Gas Exchange - Impaired  Goal: Absence of hypoxia  Outcome: Progressing Towards Goal  Goal: Promote optimal lung function  Outcome: Progressing Towards Goal     Problem:  Body Temperature -  Risk of, Imbalanced  Goal: Will regain or maintain usual level of consciousness  Outcome: Progressing Towards Goal     Problem: Isolation Precautions - Risk of Spread of Infection  Goal: Prevent transmission of infectious organism to others  Outcome: Progressing Towards Goal     Problem: Risk for Fluid Volume Deficit  Goal: Maintain normal heart rhythm  Outcome: Progressing Towards Goal  Goal: Maintain absence of muscle cramping  Outcome: Progressing Towards Goal  Goal: Maintain normal serum potassium, sodium, calcium, phosphorus, and pH  Outcome: Progressing Towards Goal     Problem: Loneliness or Risk for Loneliness  Goal: Demonstrate positive use of time alone when socialization is not possible  Outcome: Progressing Towards Goal

## 2021-07-25 NOTE — PROGRESS NOTES
Hospitalist Progress Note    NAME: Bette Sanchez   :  1980   MRN:  [id number]       Assessment / Plan:  Acute hypoxic respiratory failure POA  Covid-19 PNA  -S/p IV remdesivir  -cont IV Decadron  -On HFNC O2 support, weaning down, currently on 12 L  -Patient no distress, speaking full sentences no accessory muscle use  -Continue PT/OT  -Sinus tachycardia 2/2 respiratory issues, had brief episodic bradycardia few days ago, likely vagally mediated  -Mobilize  -We will need O2 screening prior to DC  -Continue to wean O2 as able maintaining sats > 90  -will need ambulatory O2 screening prior to DC    Anxiety  -prn xanax  -tachycardic and anxious during rounds:     Obesity  Body mass index is 47.65 kg/m². -weight loss counseling    Estimated discharge date:     Code status: Full  Prophylaxis: Lovenox     Subjective:     Chief Complaint / Reason for Physician Visit  Off O2 on room air at rest. Tachycardic during rounds, very anxious, states \"it's time for me to go home\"    discussed with RN events overnight. Review of Systems:  Symptom Y/N Comments  Symptom Y/N Comments   Fever/Chills n   Chest Pain n    Poor Appetite n   Edema     Cough n   Abdominal Pain n    Sputum n   Joint Pain     SOB/BROOKS n   Pruritis/Rash     Nausea/vomit n   Tolerating PT/OT     Diarrhea    Tolerating Diet y    Constipation    Other       Could NOT obtain due to:      Objective:     VITALS:   Last 24hrs VS reviewed since prior progress note.  Most recent are:  Patient Vitals for the past 24 hrs:   Temp Pulse Resp BP SpO2   21 1128 98.6 °F (37 °C) (!) 108 25 102/78 92 %   21 0835     91 %   21 0752 98.6 °F (37 °C) 87 29 120/80 90 %   21 0219 98.4 °F (36.9 °C) 77 23 126/84 93 %   21 0132     94 %   21 2223 98.7 °F (37.1 °C) 94 23 132/81 92 %   21 2005 98.8 °F (37.1 °C) (!) 113 (!) 36 124/79 93 %   21 1952     94 %   07/24/21 1534 98.4 °F (36.9 °C) (!) 107 80 107/77 90 %   07/24/21 1348     96 %       Intake/Output Summary (Last 24 hours) at 7/25/2021 1303  Last data filed at 7/25/2021 1128  Gross per 24 hour   Intake 360 ml   Output    Net 360 ml        I had a face to face encounter and independently examined this patient on 7/25/2021, as outlined below:  PHYSICAL EXAM:  General: WD, WN. Alert, cooperative, no acute distress    EENT:  EOMI. Anicteric sclerae. MMM  Resp:  CTA bilaterally, no wheezing or rales. No accessory muscle use  CV:  Regular  rhythm,  No edema  GI:  Soft, Non distended, Non tender. +Bowel sounds  Neurologic:  Alert and oriented X 3, normal speech,   Psych:   Good insight. Not anxious nor agitated  Skin:  No rashes. No jaundice    Reviewed most current lab test results and cultures  YES  Reviewed most current radiology test results   YES  Review and summation of old records today    NO  Reviewed patient's current orders and MAR    YES  PMH/ reviewed - no change compared to H&P  ________________________________________________________________________  Care Plan discussed with:    Comments   Patient x    Family      RN x    Care Manager     Consultant                        Multidiciplinary team rounds were held today with , nursing, pharmacist and clinical coordinator. Patient's plan of care was discussed; medications were reviewed and discharge planning was addressed. ________________________________________________________________________  Total NON critical care TIME:  35   Minutes    Total CRITICAL CARE TIME Spent:   Minutes non procedure based      Comments   >50% of visit spent in counseling and coordination of care x    ________________________________________________________________________  Radha Torres, DO     Procedures: see electronic medical records for all procedures/Xrays and details which were not copied into this note but were reviewed prior to creation of Plan.       LABS:  I reviewed today's most current labs and imaging studies.   Pertinent labs include:  Recent Labs     07/25/21 0136 07/24/21 0349 07/23/21 0349   WBC 12.4* 12.8* 8.3   HGB 14.6 14.8 12.8   HCT 46.6 46.8 40.2    360 322     Recent Labs     07/25/21 0136 07/24/21 0349 07/23/21 0349   NA  --  139 142   K  --  4.1 3.4*   CL  --  103 108   CO2  --  30 28   GLU  --  122* 138*   BUN  --  16 16   CREA  --  0.77 0.71   CA  --  9.1 7.5*   MG 2.4 2.2 2.4   PHOS 4.9* 5.0* 3.7       Signed: Leonarda Leonardo DO

## 2021-07-25 NOTE — PROGRESS NOTES
Verbal shift change report given to Dante Gan (oncoming nurse) by Elizabeth Davies (offgoing nurse). Report included the following information SBAR, Kardex, Intake/Output, MAR, Recent Results and Cardiac Rhythm sinus rhythm to sinus tach.

## 2021-07-25 NOTE — PROGRESS NOTES
0700- report recieved from Greg, [facility]    2230- End of Shift Note    Bedside shift change report given to JERI Garza (oncoming nurse) by Shasta Feldman RN (offgoing nurse). Report included the following information SBAR, Kardex, Med Rec Status and Cardiac Rhythm ST    Shift worked:  7a-7p     Shift summary and any significant changes:     monitor HR, went up to 140s     Concerns for physician to address:       Zone phone for oncoming shift:          Activity:  Activity Level: Up with Assistance  Number times ambulated in hallways past shift: 0  Number of times OOB to chair past shift: 1    Cardiac:   Cardiac Monitoring: Yes      Cardiac Rhythm: Sinus Tach    Access:   Current line(s): PIV     Genitourinary:   Urinary status: voiding    Respiratory:   O2 Device: None (Room air)  Chronic home O2 use?: NO  Incentive spirometer at bedside: NO     GI:  Last Bowel Movement Date: 07/24/21  Current diet:  ADULT DIET Regular  ADULT ORAL NUTRITION SUPPLEMENT Breakfast, Lunch, Dinner; Standard High Calorie/High Protein  Passing flatus: YES  Tolerating current diet: YES       Pain Management:   Patient states pain is manageable on current regimen: YES    Skin:  James Score: 20  Interventions: increase time out of bed, PT/OT consult and nutritional support     Patient Safety:  Fall Score:  Total Score: 1  Interventions: bed/chair alarm, gripper socks and pt to call before getting OOB  High Fall Risk: Yes    Length of Stay:  Expected LOS: 4d 19h  Actual LOS: Britt Ayala, RN

## 2021-07-26 VITALS
TEMPERATURE: 98.6 F | DIASTOLIC BLOOD PRESSURE: 68 MMHG | HEIGHT: 67 IN | HEART RATE: 107 BPM | SYSTOLIC BLOOD PRESSURE: 98 MMHG | RESPIRATION RATE: 22 BRPM | WEIGHT: 293 LBS | OXYGEN SATURATION: 92 % | BODY MASS INDEX: 45.99 KG/M2

## 2021-07-26 LAB
BASOPHILS # BLD: 0 K/UL (ref 0–0.1)
BASOPHILS NFR BLD: 0 % (ref 0–1)
DIFFERENTIAL METHOD BLD: ABNORMAL
EOSINOPHIL # BLD: 0.6 K/UL (ref 0–0.4)
EOSINOPHIL NFR BLD: 5 % (ref 0–7)
ERYTHROCYTE [DISTWIDTH] IN BLOOD BY AUTOMATED COUNT: 17 % (ref 11.5–14.5)
HCT VFR BLD AUTO: 47.3 % (ref 35–47)
HGB BLD-MCNC: 15.2 G/DL (ref 11.5–16)
IMM GRANULOCYTES # BLD AUTO: 0 K/UL (ref 0–0.04)
IMM GRANULOCYTES NFR BLD AUTO: 0 % (ref 0–0.5)
LYMPHOCYTES # BLD: 2.1 K/UL (ref 0.8–3.5)
LYMPHOCYTES NFR BLD: 19 % (ref 12–49)
MAGNESIUM SERPL-MCNC: 2.6 MG/DL (ref 1.6–2.4)
MCH RBC QN AUTO: 26.6 PG (ref 26–34)
MCHC RBC AUTO-ENTMCNC: 32.1 G/DL (ref 30–36.5)
MCV RBC AUTO: 82.7 FL (ref 80–99)
METAMYELOCYTES NFR BLD MANUAL: 1 %
MONOCYTES # BLD: 1 K/UL (ref 0–1)
MONOCYTES NFR BLD: 9 % (ref 5–13)
NEUTS SEG # BLD: 7.5 K/UL (ref 1.8–8)
NEUTS SEG NFR BLD: 66 % (ref 32–75)
NRBC # BLD: 0 K/UL (ref 0–0.01)
NRBC BLD-RTO: 0 PER 100 WBC
PHOSPHATE SERPL-MCNC: 4.6 MG/DL (ref 2.6–4.7)
PLATELET # BLD AUTO: 378 K/UL (ref 150–400)
PMV BLD AUTO: 9.3 FL (ref 8.9–12.9)
RBC # BLD AUTO: 5.72 M/UL (ref 3.8–5.2)
RBC MORPH BLD: ABNORMAL
WBC # BLD AUTO: 11.3 K/UL (ref 3.6–11)

## 2021-07-26 PROCEDURE — 84100 ASSAY OF PHOSPHORUS: CPT

## 2021-07-26 PROCEDURE — 36415 COLL VENOUS BLD VENIPUNCTURE: CPT

## 2021-07-26 PROCEDURE — 74011250636 HC RX REV CODE- 250/636: Performed by: GENERAL ACUTE CARE HOSPITAL

## 2021-07-26 PROCEDURE — 74011250637 HC RX REV CODE- 250/637: Performed by: STUDENT IN AN ORGANIZED HEALTH CARE EDUCATION/TRAINING PROGRAM

## 2021-07-26 PROCEDURE — 74011250636 HC RX REV CODE- 250/636: Performed by: INTERNAL MEDICINE

## 2021-07-26 PROCEDURE — 74011250637 HC RX REV CODE- 250/637: Performed by: GENERAL ACUTE CARE HOSPITAL

## 2021-07-26 PROCEDURE — 85025 COMPLETE CBC W/AUTO DIFF WBC: CPT

## 2021-07-26 PROCEDURE — 83735 ASSAY OF MAGNESIUM: CPT

## 2021-07-26 RX ORDER — PREDNISONE 20 MG/1
TABLET ORAL
Qty: 7 TABLET | Refills: 0 | Status: SHIPPED | OUTPATIENT
Start: 2021-07-26

## 2021-07-26 RX ADMIN — GUAIFENESIN 600 MG: 600 TABLET, EXTENDED RELEASE ORAL at 08:42

## 2021-07-26 RX ADMIN — ZINC SULFATE 220 MG (50 MG) CAPSULE 1 CAPSULE: CAPSULE at 08:42

## 2021-07-26 RX ADMIN — DEXAMETHASONE SODIUM PHOSPHATE 6 MG: 4 INJECTION, SOLUTION INTRAMUSCULAR; INTRAVENOUS at 08:42

## 2021-07-26 RX ADMIN — OXYCODONE HYDROCHLORIDE AND ACETAMINOPHEN 500 MG: 500 TABLET ORAL at 08:42

## 2021-07-26 RX ADMIN — LORAZEPAM 1 MG: 2 INJECTION INTRAMUSCULAR; INTRAVENOUS at 02:38

## 2021-07-26 RX ADMIN — ENOXAPARIN SODIUM 40 MG: 40 INJECTION SUBCUTANEOUS at 08:42

## 2021-07-26 NOTE — DISCHARGE SUMMARY
Hospitalist Discharge Summary     Patient ID:  Popeye Quinonez  [id number]  47 y.o.  1980  7/17/2021    PCP on record: None    Admit date: 7/17/2021  Discharge date and time: 7/26/2021    DISCHARGE DIAGNOSIS:    See below    CONSULTATIONS:  IP CONSULT TO PULMONOLOGY  IP CONSULT TO CARDIOLOGY  IP CONSULT TO ELECTROPHYSIOLOGY    Excerpted HPI from H&P of Jacob Portillo MD  Saeed Mitchell is a 47 y.o.  female who presents with CC listed above. Pt states that her daughter was the first one in the family to be diagnosed with COVID-19. They have been quaranting together since appox July 3rd. Her  is also ill with COVID-19 but she states that she has had the worst of symptoms. She was tested at an outside facility on Monday and was told that she was positive on Tuesday. Her and her family have not been vaccinated. She endorses cough with minimal sputum production. ______________________________________________________________________  DISCHARGE SUMMARY/HOSPITAL COURSE:  for full details see H&P, daily progress notes, labs, consult notes. Acute hypoxic respiratory failure POA  Covid-19 PNA  -S/p IV remdesivir  -cont IV Decadron  -On HFNC O2 support, weaning down, currently on 12 L  -Patient no distress, speaking full sentences no accessory muscle use  -Continue PT/OT  -Sinus tachycardia 2/2 respiratory issues, had brief episodic bradycardia few days ago, likely vagally mediated  -Mobilize  -on room air  -maintained sats > 90 with ambulation day of DC     Anxiety  -prn xanax  -tachycardic and anxious during rounds:      Obesity  Body mass index is 47.65 kg/m². -weight loss counseling    _______________________________________________________________________  Patient seen and examined by me on discharge day. Pertinent Findings:  Gen:    Not in distress  Chest: Clear lungs  CVS:   Regular rhythm.   No edema  Abd:  Soft, not distended, not tender  Neuro:  Alert, oriented x 3  _______________________________________________________________________  DISCHARGE MEDICATIONS:   Current Discharge Medication List      START taking these medications    Details   ipratropium-albuteroL (COMBIVENT RESPIMAT)  mcg/actuation inhaler Take 1 Puff by inhalation every four (4) hours as needed for Wheezing or Respiratory Distress. Qty: 1 Inhaler, Refills: 1  Start date: 7/26/2021      predniSONE (DELTASONE) 20 mg tablet Take 2 tabs by mouth with breakfast for 2 days, then take one tab for 2 days, then take 1/2 tab for 2 days, then discontinue  Qty: 7 Tablet, Refills: 0  Start date: 7/26/2021               Patient Follow Up Instructions:    Activity: Activity as tolerated  Diet: Low fat, Low cholesterol    Follow-up:    Follow-up Information     Follow up With Specialties Details Why Contact Info    None    None (395) Patient stated that they have no PCP           ________________________________________________________________    Risk of deterioration: low    Condition at Discharge:  Stable  __________________________________________________________________    Disposition  Home with family, no needs    ____________________________________________________________________    Code Status: Full Code  ___________________________________________________________________      Total time in minutes spent coordinating this discharge (includes going over instructions, follow-up, prescriptions, and preparing report for sign off to her PCP) :  >30 minutes    Signed:  Tyra Fair DO

## 2021-07-26 NOTE — PROGRESS NOTES
Problem: Airway Clearance - Ineffective  Goal: Achieve or maintain patent airway  Outcome: Progressing Towards Goal     Problem: Breathing Pattern - Ineffective  Goal: Ability to achieve and maintain a regular respiratory rate  Outcome: Progressing Towards Goal     Problem: Isolation Precautions - Risk of Spread of Infection  Goal: Prevent transmission of infectious organism to others  Outcome: Progressing Towards Goal     Problem: Loneliness or Risk for Loneliness  Goal: Demonstrate positive use of time alone when socialization is not possible  Outcome: Progressing Towards Goal

## 2021-07-26 NOTE — PROGRESS NOTES
Verbal shift change report given to Jenifer Brower (oncoming nurse) by Jimbo Hensley (offgoing nurse). Report included the following information SBAR, Kardex, Intake/Output, MAR, Recent Results and Cardiac Rhythm sinus tach. End of Shift Note    Bedside shift change report given to Jimbo Hensley (oncoming nurse) by Nadia Muniz RN (offgoing nurse). Report included the following information SBAR, Kardex, ED Summary, Intake/Output, Recent Results and Cardiac Rhythm sinus rhythm    Shift worked:  [id number]     Shift summary and any significant changes:     No significant events. Room air. Labs unremarkable. Plan for DC     Concerns for physician to address:       Zone phone for oncoming shift:          Activity:  Activity Level: Up with Assistance  Number times ambulated in hallways past shift: 0  Number of times OOB to chair past shift: 1    Cardiac:   Cardiac Monitoring: Yes      Cardiac Rhythm: Sinus Rhythm    Access:   Current line(s): PIV     Genitourinary:   Urinary status: voiding    Respiratory:   O2 Device: None (Room air)  Chronic home O2 use?: NO  Incentive spirometer at bedside: NO     GI:  Last Bowel Movement Date: 07/25/21  Current diet:  ADULT DIET Regular  ADULT ORAL NUTRITION SUPPLEMENT Breakfast, Lunch, Dinner; Standard High Calorie/High Protein  Passing flatus: YES  Tolerating current diet: YES       Pain Management:   Patient states pain is manageable on current regimen: YES    Skin:  James Score: 20  Interventions: increase time out of bed    Patient Safety:  Fall Score:  Total Score: 1  Interventions: gripper socks and pt to call before getting OOB  High Fall Risk: Yes    Length of Stay:  Expected LOS: 4d 19h  Actual LOS: 9      Nadia Muniz RN

## 2021-07-26 NOTE — PROGRESS NOTES
Pharmacist Discharge Medication Reconciliation    Significant PMH:   Past Medical History:   Diagnosis Date    Cholestasis of pregnancy 4/3/2013    Diabetes mellitus     HX OTHER MEDICAL     cholestasis or pregnancy     Encounter Diagnoses:   Encounter Diagnosis   Name Primary? Pneumonia due to COVID-19 virus Yes     Allergies: Penicillins and Benadryl [diphenhydramine hcl]    Discharge Medications:   Current Discharge Medication List        START taking these medications    Details   ipratropium-albuteroL (COMBIVENT RESPIMAT)  mcg/actuation inhaler Take 1 Puff by inhalation every four (4) hours as needed for Wheezing or Respiratory Distress. Qty: 1 Inhaler, Refills: 1  Start date: 7/26/2021      predniSONE (DELTASONE) 20 mg tablet Take 2 tabs by mouth with breakfast for 2 days, then take one tab for 2 days, then take 1/2 tab for 2 days, then discontinue  Qty: 7 Tablet, Refills: 0  Start date: 7/26/2021             The patient's chart, MAR and AVS were reviewed by Mitesh Atwood [facility].     Discharging Provider: Brooks Powers    Thank you,     Mitesh Atwood, [facility]

## 2021-07-26 NOTE — PROGRESS NOTES
0700- report received from [address]- Pulse oximetry assessment   98% at rest on room air (if 88% or less, skip next steps)  91% while ambulating on room air      1130- patient received discharge instructions including follow up appointments, updated med list, and education. Monitor and IV removed. Patient gathered all personal belongings and is waiting on ride    [id number]- patient left unit in a wheelchair.

## 2021-07-26 NOTE — ROUTINE PROCESS
Ember Prasad Follow Up on 8/4/2021 at 10:30am with JAMES Hahn and arrival time is 10:15am. Appt added to AVS

## 2021-07-26 NOTE — PROGRESS NOTES
Transition of Care Plan:   Patient's  will transport her home. She has called him and he will be here early noon. RUR: 9%    [address]     Follow up appointments: Patient has a pcp and clinical specialist will make follow up appointment. DME needed: None    Transportation at [address] or means to access home:  to provide        IM Medicare letter: n/a commercial insurance     Caregiver Contact: Mother Tamera Taylor [phone]    Discharge Caregiver contacted prior to discharge?  contacted by patient. Patient is being discharged home today. She states she has called her  and he will be transporting her home today. Patient has a primary care md at [facility]. Clinical specialist will call the office to make a follow up appointment. Patient politely declined needing home health services. Lisha Hernandez RN BSN CRM        [phone]

## 2021-07-26 NOTE — PROGRESS NOTES
Problem: Gas Exchange - Impaired  Goal: Promote optimal lung function  Outcome: Progressing Towards Goal

## 2021-07-27 ENCOUNTER — PATIENT OUTREACH (OUTPATIENT)
Dept: CASE MANAGEMENT | Age: 41
End: 2021-07-27

## 2021-07-28 NOTE — PROGRESS NOTES
Care Transitions Outreach Attempt    Call within 2 business days of discharge: Yes   Attempted to reach patient for transitions of care follow up. Unable to reach patient. Patient: Chris Escoto Patient : 1980 MRN: [id number]    Last Discharge [address]       Complaint Diagnosis Description Type Department Provider    23 Positive For Covid-19 Pneumonia due to COVID-19 virus ED to Hosp-Admission (Discharged) (ADMIT) [id number] Ajit Sheikh, DO; Catl. .. Was this an external facility discharge? No Discharge Facility: [facility]      Noted following upcoming appointments from discharge chart review:   [address] follow up appointment(s): No future appointments.   [facility] follow up appointment(s): none

## 2022-03-19 PROBLEM — U07.1 PNEUMONIA DUE TO COVID-19 VIRUS: Status: ACTIVE | Noted: 2021-07-17

## 2022-03-19 PROBLEM — I44.1 2ND DEGREE AV BLOCK: Status: ACTIVE | Noted: 2021-07-19

## 2022-03-19 PROBLEM — J12.82 PNEUMONIA DUE TO COVID-19 VIRUS: Status: ACTIVE | Noted: 2021-07-17

## 2023-05-26 RX ORDER — PREDNISONE 20 MG/1
TABLET ORAL
COMMUNITY
Start: 2021-07-26